# Patient Record
Sex: FEMALE | Race: WHITE | NOT HISPANIC OR LATINO
[De-identification: names, ages, dates, MRNs, and addresses within clinical notes are randomized per-mention and may not be internally consistent; named-entity substitution may affect disease eponyms.]

---

## 2017-05-10 ENCOUNTER — APPOINTMENT (OUTPATIENT)
Dept: ORTHOPEDIC SURGERY | Facility: CLINIC | Age: 82
End: 2017-05-10

## 2017-05-10 DIAGNOSIS — Z82.61 FAMILY HISTORY OF ARTHRITIS: ICD-10-CM

## 2017-05-10 DIAGNOSIS — M17.12 UNILATERAL PRIMARY OSTEOARTHRITIS, LEFT KNEE: ICD-10-CM

## 2017-05-10 DIAGNOSIS — Z87.39 PERSONAL HISTORY OF OTHER DISEASES OF THE MUSCULOSKELETAL SYSTEM AND CONNECTIVE TISSUE: ICD-10-CM

## 2017-05-11 ENCOUNTER — APPOINTMENT (OUTPATIENT)
Dept: GERIATRICS | Facility: CLINIC | Age: 82
End: 2017-05-11

## 2017-05-11 VITALS
BODY MASS INDEX: 21.16 KG/M2 | DIASTOLIC BLOOD PRESSURE: 70 MMHG | WEIGHT: 112 LBS | RESPIRATION RATE: 18 BRPM | OXYGEN SATURATION: 97 % | HEART RATE: 70 BPM | SYSTOLIC BLOOD PRESSURE: 120 MMHG

## 2017-05-11 DIAGNOSIS — R25.8 OTHER ABNORMAL INVOLUNTARY MOVEMENTS: ICD-10-CM

## 2017-05-11 DIAGNOSIS — F41.8 OTHER SPECIFIED ANXIETY DISORDERS: ICD-10-CM

## 2017-05-11 RX ORDER — ESCITALOPRAM OXALATE 5 MG/1
5 TABLET ORAL
Qty: 30 | Refills: 5 | Status: ACTIVE | COMMUNITY
Start: 2017-05-11 | End: 1900-01-01

## 2017-06-22 RX ORDER — HYALURONATE SODIUM 20 MG/2 ML
20 SYRINGE (ML) INTRAARTICULAR
Qty: 6 | Refills: 0 | Status: ACTIVE | OUTPATIENT
Start: 2017-05-10

## 2017-10-18 ENCOUNTER — APPOINTMENT (OUTPATIENT)
Dept: ORTHOPEDIC SURGERY | Facility: CLINIC | Age: 82
End: 2017-10-18

## 2018-12-19 ENCOUNTER — INPATIENT (INPATIENT)
Facility: HOSPITAL | Age: 83
LOS: 6 days | Discharge: HOME | DRG: 480 | End: 2018-12-26
Attending: FAMILY MEDICINE | Admitting: FAMILY MEDICINE
Payer: MEDICARE

## 2018-12-19 ENCOUNTER — TRANSCRIPTION ENCOUNTER (OUTPATIENT)
Age: 83
End: 2018-12-19

## 2018-12-19 VITALS
WEIGHT: 104.94 LBS | TEMPERATURE: 98 F | DIASTOLIC BLOOD PRESSURE: 85 MMHG | RESPIRATION RATE: 14 BRPM | SYSTOLIC BLOOD PRESSURE: 145 MMHG | OXYGEN SATURATION: 100 % | HEART RATE: 74 BPM

## 2018-12-19 DIAGNOSIS — S72.002A FRACTURE OF UNSPECIFIED PART OF NECK OF LEFT FEMUR, INITIAL ENCOUNTER FOR CLOSED FRACTURE: ICD-10-CM

## 2018-12-19 LAB
ALBUMIN SERPL ELPH-MCNC: 3.7 G/DL — SIGNIFICANT CHANGE UP (ref 3.3–5)
ALP SERPL-CCNC: 43 U/L — SIGNIFICANT CHANGE UP (ref 40–120)
ALT FLD-CCNC: 11 U/L — LOW (ref 12–78)
ANION GAP SERPL CALC-SCNC: 4 MMOL/L — LOW (ref 5–17)
APTT BLD: 27.8 SEC — SIGNIFICANT CHANGE UP (ref 27.5–36.3)
AST SERPL-CCNC: 17 U/L — SIGNIFICANT CHANGE UP (ref 15–37)
BASOPHILS # BLD AUTO: 0.02 K/UL — SIGNIFICANT CHANGE UP (ref 0–0.2)
BASOPHILS NFR BLD AUTO: 0.2 % — SIGNIFICANT CHANGE UP (ref 0–2)
BILIRUB SERPL-MCNC: 0.3 MG/DL — SIGNIFICANT CHANGE UP (ref 0.2–1.2)
BLD GP AB SCN SERPL QL: SIGNIFICANT CHANGE UP
BUN SERPL-MCNC: 43 MG/DL — HIGH (ref 7–23)
CALCIUM SERPL-MCNC: 8.5 MG/DL — SIGNIFICANT CHANGE UP (ref 8.5–10.1)
CHLORIDE SERPL-SCNC: 106 MMOL/L — SIGNIFICANT CHANGE UP (ref 96–108)
CO2 SERPL-SCNC: 31 MMOL/L — SIGNIFICANT CHANGE UP (ref 22–31)
CREAT SERPL-MCNC: 0.99 MG/DL — SIGNIFICANT CHANGE UP (ref 0.5–1.3)
EOSINOPHIL # BLD AUTO: 0 K/UL — SIGNIFICANT CHANGE UP (ref 0–0.5)
EOSINOPHIL NFR BLD AUTO: 0 % — SIGNIFICANT CHANGE UP (ref 0–6)
GLUCOSE SERPL-MCNC: 172 MG/DL — HIGH (ref 70–99)
HCT VFR BLD CALC: 33 % — LOW (ref 34.5–45)
HGB BLD-MCNC: 10.8 G/DL — LOW (ref 11.5–15.5)
IMM GRANULOCYTES NFR BLD AUTO: 0.5 % — SIGNIFICANT CHANGE UP (ref 0–1.5)
INR BLD: 1.04 RATIO — SIGNIFICANT CHANGE UP (ref 0.88–1.16)
LYMPHOCYTES # BLD AUTO: 0.43 K/UL — LOW (ref 1–3.3)
LYMPHOCYTES # BLD AUTO: 3.5 % — LOW (ref 13–44)
MCHC RBC-ENTMCNC: 27.8 PG — SIGNIFICANT CHANGE UP (ref 27–34)
MCHC RBC-ENTMCNC: 32.7 GM/DL — SIGNIFICANT CHANGE UP (ref 32–36)
MCV RBC AUTO: 84.8 FL — SIGNIFICANT CHANGE UP (ref 80–100)
MONOCYTES # BLD AUTO: 0.8 K/UL — SIGNIFICANT CHANGE UP (ref 0–0.9)
MONOCYTES NFR BLD AUTO: 6.5 % — SIGNIFICANT CHANGE UP (ref 2–14)
NEUTROPHILS # BLD AUTO: 11.08 K/UL — HIGH (ref 1.8–7.4)
NEUTROPHILS NFR BLD AUTO: 89.3 % — HIGH (ref 43–77)
NRBC # BLD: 0 /100 WBCS — SIGNIFICANT CHANGE UP (ref 0–0)
PLATELET # BLD AUTO: 288 K/UL — SIGNIFICANT CHANGE UP (ref 150–400)
POTASSIUM SERPL-MCNC: 4.2 MMOL/L — SIGNIFICANT CHANGE UP (ref 3.5–5.3)
POTASSIUM SERPL-SCNC: 4.2 MMOL/L — SIGNIFICANT CHANGE UP (ref 3.5–5.3)
PROT SERPL-MCNC: 7 G/DL — SIGNIFICANT CHANGE UP (ref 6–8.3)
PROTHROM AB SERPL-ACNC: 11.8 SEC — SIGNIFICANT CHANGE UP (ref 10–12.9)
RBC # BLD: 3.89 M/UL — SIGNIFICANT CHANGE UP (ref 3.8–5.2)
RBC # FLD: 13.1 % — SIGNIFICANT CHANGE UP (ref 10.3–14.5)
SODIUM SERPL-SCNC: 141 MMOL/L — SIGNIFICANT CHANGE UP (ref 135–145)
WBC # BLD: 12.39 K/UL — HIGH (ref 3.8–10.5)
WBC # FLD AUTO: 12.39 K/UL — HIGH (ref 3.8–10.5)

## 2018-12-19 PROCEDURE — 72170 X-RAY EXAM OF PELVIS: CPT | Mod: 26,59

## 2018-12-19 PROCEDURE — 71045 X-RAY EXAM CHEST 1 VIEW: CPT | Mod: 26

## 2018-12-19 PROCEDURE — 73502 X-RAY EXAM HIP UNI 2-3 VIEWS: CPT | Mod: 26,LT

## 2018-12-19 PROCEDURE — 73610 X-RAY EXAM OF ANKLE: CPT | Mod: 26,LT

## 2018-12-19 PROCEDURE — 99285 EMERGENCY DEPT VISIT HI MDM: CPT

## 2018-12-19 PROCEDURE — 93010 ELECTROCARDIOGRAM REPORT: CPT

## 2018-12-19 PROCEDURE — 73562 X-RAY EXAM OF KNEE 3: CPT | Mod: 26,LT

## 2018-12-19 PROCEDURE — 73552 X-RAY EXAM OF FEMUR 2/>: CPT | Mod: 26,LT

## 2018-12-19 RX ORDER — OXYCODONE HYDROCHLORIDE 5 MG/1
5 TABLET ORAL
Qty: 0 | Refills: 0 | Status: DISCONTINUED | OUTPATIENT
Start: 2018-12-19 | End: 2018-12-20

## 2018-12-19 RX ORDER — MORPHINE SULFATE 50 MG/1
2 CAPSULE, EXTENDED RELEASE ORAL ONCE
Qty: 0 | Refills: 0 | Status: DISCONTINUED | OUTPATIENT
Start: 2018-12-19 | End: 2018-12-19

## 2018-12-19 RX ORDER — MORPHINE SULFATE 50 MG/1
2 CAPSULE, EXTENDED RELEASE ORAL EVERY 6 HOURS
Qty: 0 | Refills: 0 | Status: DISCONTINUED | OUTPATIENT
Start: 2018-12-19 | End: 2018-12-20

## 2018-12-19 RX ORDER — MAGNESIUM OXIDE 400 MG ORAL TABLET 241.3 MG
400 TABLET ORAL DAILY
Qty: 0 | Refills: 0 | Status: DISCONTINUED | OUTPATIENT
Start: 2018-12-19 | End: 2018-12-20

## 2018-12-19 RX ORDER — TRAMADOL HYDROCHLORIDE 50 MG/1
25 TABLET ORAL
Qty: 0 | Refills: 0 | Status: DISCONTINUED | OUTPATIENT
Start: 2018-12-19 | End: 2018-12-20

## 2018-12-19 RX ORDER — LISINOPRIL 2.5 MG/1
2.5 TABLET ORAL DAILY
Qty: 0 | Refills: 0 | Status: DISCONTINUED | OUTPATIENT
Start: 2018-12-19 | End: 2018-12-20

## 2018-12-19 RX ORDER — CARBIDOPA AND LEVODOPA 25; 100 MG/1; MG/1
1 TABLET ORAL THREE TIMES A DAY
Qty: 0 | Refills: 0 | Status: DISCONTINUED | OUTPATIENT
Start: 2018-12-19 | End: 2018-12-20

## 2018-12-19 RX ORDER — ONDANSETRON 8 MG/1
4 TABLET, FILM COATED ORAL ONCE
Qty: 0 | Refills: 0 | Status: COMPLETED | OUTPATIENT
Start: 2018-12-19 | End: 2018-12-19

## 2018-12-19 RX ORDER — SODIUM CHLORIDE 9 MG/ML
1000 INJECTION, SOLUTION INTRAVENOUS
Qty: 0 | Refills: 0 | Status: DISCONTINUED | OUTPATIENT
Start: 2018-12-19 | End: 2018-12-20

## 2018-12-19 RX ORDER — HEPARIN SODIUM 5000 [USP'U]/ML
5000 INJECTION INTRAVENOUS; SUBCUTANEOUS EVERY 12 HOURS
Qty: 0 | Refills: 0 | Status: DISCONTINUED | OUTPATIENT
Start: 2018-12-19 | End: 2018-12-19

## 2018-12-19 RX ADMIN — CARBIDOPA AND LEVODOPA 1 TABLET(S): 25; 100 TABLET ORAL at 21:46

## 2018-12-19 NOTE — H&P ADULT - HISTORY OF PRESENT ILLNESS
SIMONE RIEVRA is an 83y Female brought to ER because of left hip pain after a fall at home.  Patient was unable to get up from floor.  No LOC patient triped and fall onto left side at home. No head injury, headache, neck or back pain. SIMONE RIVERA is an 84 YO Female brought to ER because of left hip pain after a fall at home.  Patient was unable to get up from floor after a fall due to pain in left hip.  No LOC patient tripped and fall onto left side at home. No head injury, headache, neck or back pain.

## 2018-12-19 NOTE — ED ADULT NURSE NOTE - NSIMPLEMENTINTERV_GEN_ALL_ED
Implemented All Fall with Harm Risk Interventions:  [place] to call system. Call bell, personal items and telephone within reach. Instruct patient to call for assistance. Room bathroom lighting operational. Non-slip footwear when patient is off stretcher. Physically safe environment: no spills, clutter or unnecessary equipment. Stretcher in lowest position, wheels locked, appropriate side rails in place. Provide visual cue, wrist band, yellow gown, etc. Monitor gait and stability. Monitor for mental status changes and reorient to person, place, and time. Review medications for side effects contributing to fall risk. Reinforce activity limits and safety measures with patient and family. Provide visual clues: red socks.

## 2018-12-19 NOTE — H&P ADULT - NSHPLABSRESULTS_GEN_ALL_CORE
10.8   12.39 )-----------( 288      ( 19 Dec 2018 18:59 )             33.0     19 Dec 2018 18:59    141    |  106    |  43     ----------------------------<  172    4.2     |  31     |  0.99     Ca    8.5        19 Dec 2018 18:59    TPro  7.0    /  Alb  3.7    /  TBili  0.3    /  DBili  x      /  AST  17     /  ALT  11     /  AlkPhos  43     19 Dec 2018 18:59    LIVER FUNCTIONS - ( 19 Dec 2018 18:59 )  Alb: 3.7 g/dL / Pro: 7.0 g/dL / ALK PHOS: 43 U/L / ALT: 11 U/L / AST: 17 U/L / GGT: x           PT/INR - ( 19 Dec 2018 20:31 )   PT: 11.8 sec;   INR: 1.04 ratio         PTT - ( 19 Dec 2018 20:31 )  PTT:27.8 sec  CAPILLARY BLOOD GLUCOSE            Urinalysis Basic - ( 20 Dec 2018 01:29 )    Color: Yellow / Appearance: Clear / S.020 / pH: x  Gluc: x / Ketone: Negative  / Bili: Negative / Urobili: Negative   Blood: x / Protein: 25 mg/dL / Nitrite: Negative   Leuk Esterase: Small / RBC: Negative /HPF / WBC 3-5   Sq Epi: x / Non Sq Epi: Occasional / Bacteria: Occasional    < from: Xray Ankle Complete 3 Views, Left (18 @ 20:02) >    EXAM:  ANKLE LEFT (MINIMUM 3 V)                            PROCEDURE DATE:  2018          INTERPRETATION:  DATE OF STUDY: 18.    COMPARISON: 18 pelvis; left hip; left femur films.    CLINICAL HISTORY:  Status post left leg trauma    Technique: 2 left ankle films.    FINDINGS:  No fracture or dislocation.  Ankle mortise and talar dome are maintained.  Venous phleboliths noted.    IMPRESSION: No acute fracture-subluxation demonstrated.    < end of copied text >

## 2018-12-19 NOTE — ED PROVIDER NOTE - OBJECTIVE STATEMENT
pt bib ems for left hip pain s/p trip and fall onto left side at home. no head inj, ha, neck or back pain, weakness, numbness, cp, sob, abd pain, arm pain.  pmd - franny

## 2018-12-19 NOTE — H&P ADULT - RS GEN PE MLT RESP DETAILS PC
no chest wall tenderness/no intercostal retractions/clear to auscultation bilaterally/airway patent/respirations non-labored/breath sounds equal/good air movement

## 2018-12-19 NOTE — ED ADULT NURSE NOTE - OBJECTIVE STATEMENT
84 y/o F patient presents to ED from home c/o fall. Patient's daughter reports patient was sitting in chair when she got up and fell onto her left side. Patient's daughter reports patient was on floor and was unable to get up. Patient reports she has pain in left lower extremity only upon exertion and no pain at rest. Patient denies any previous history of falls. Patient A&Ox3. patient Mauritian speaking, daughter bedside translating, refusing translation services. lungs CTA. skin warm and intact. abdomen soft, non tender, non distended. left lower extremity shortening noted. equal sensation in upper and lower extremities b/l. Patient denies HA, dizziness, SOB,c hest pain, abdominal pain, N/V/D, bowel/bladder changes, fevers/chills, cough. VSS. Safety and comfort measures provided and maintained. MD bedside.

## 2018-12-19 NOTE — PROGRESS NOTE ADULT - SUBJECTIVE AND OBJECTIVE BOX
Orthopedic Preop Note    Preop Dx: L IT fx  Surgeon: Harley	  Procedure:  L hip IMN    Vital Signs Last 24 Hrs  T(C): 36.7 (19 Dec 2018 17:31), Max: 36.7 (19 Dec 2018 17:31)  T(F): 98.1 (19 Dec 2018 17:31), Max: 98.1 (19 Dec 2018 17:31)  HR: 74 (19 Dec 2018 17:31) (74 - 74)  BP: 145/85 (19 Dec 2018 17:31) (145/85 - 145/85)  BP(mean): --  RR: 14 (19 Dec 2018 17:31) (14 - 14)  SpO2: 100% (19 Dec 2018 17:31) (100% - 100%)  CBC Full  -  ( 19 Dec 2018 18:59 )  WBC Count : 12.39 K/uL  Hemoglobin : 10.8 g/dL  Hematocrit : 33.0 %  Platelet Count - Automated : 288 K/uL  Mean Cell Volume : 84.8 fl  Mean Cell Hemoglobin : 27.8 pg  Mean Cell Hemoglobin Concentration : 32.7 gm/dL  Auto Neutrophil # : 11.08 K/uL  Auto Lymphocyte # : 0.43 K/uL  Auto Monocyte # : 0.80 K/uL  Auto Eosinophil # : 0.00 K/uL  Auto Basophil # : 0.02 K/uL  Auto Neutrophil % : 89.3 %  Auto Lymphocyte % : 3.5 %  Auto Monocyte % : 6.5 %  Auto Eosinophil % : 0.0 %  Auto Basophil % : 0.2 %    12-19    141  |  106  |  43<H>  ----------------------------<  172<H>  4.2   |  31  |  0.99    Ca    8.5      19 Dec 2018 18:59    TPro  7.0  /  Alb  3.7  /  TBili  0.3  /  DBili  x   /  AST  17  /  ALT  11<L>  /  AlkPhos  43  12-19      Daily     Daily     EKG: In Chart  CXR: Done  UA: Pending  Type and Screen: done  Clearance: medical: pending  Consent: to be done by surgeon    Plan:  OR 12/20/18 for L hip IT fx with Dr. Souza  NPO after midnight except meds  hold anticoagulation after midnight  IVF while NPO  Needs Clearance for OR

## 2018-12-19 NOTE — ED ADULT NURSE REASSESSMENT NOTE - NS ED NURSE REASSESS COMMENT FT1
Assumed care for pt awake alert and oriented x 3.  Pt denies been nauseous.  skin intact. denies pain meds at this time, but pt made aware there is an order for pain meds. Iv intact and dry. will continue to monitor

## 2018-12-19 NOTE — CONSULT NOTE ADULT - SUBJECTIVE AND OBJECTIVE BOX
83y Female, ambulates w/ walker & cane, presents c/o L hip pain and inability to ambulate sp mechanical fall at home. Denies HS/LOC. Denies numbness/tingling. Denies fever/chills. Admits mild L knee and ankle pain.  Denies pain/injury elsewhere. Indonesian-speaking, Daughter Jolanta Edwards (HCP) at bedside assisted w/ translation.      HEALTH ISSUES - PROBLEM Dx:  Parkinson's  HTN  appendectomy      MEDICATIONS  (STANDING):  morphine  - Injectable 2 milliGRAM(s) IV Push Once  ondansetron Injectable 4 milliGRAM(s) IV Push once    Allergies    No Known Allergies    Intolerances                              10.8   12.39 )-----------( 288      ( 19 Dec 2018 18:59 )             33.0     19 Dec 2018 18:59    141    |  106    |  43     ----------------------------<  172    4.2     |  31     |  0.99     Ca    8.5        19 Dec 2018 18:59    TPro  7.0    /  Alb  3.7    /  TBili  0.3    /  DBili  x      /  AST  17     /  ALT  11     /  AlkPhos  43     19 Dec 2018 18:59      Vital Signs Last 24 Hrs  T(C): 36.7 (12-19-18 @ 17:31), Max: 36.7 (12-19-18 @ 17:31)  T(F): 98.1 (12-19-18 @ 17:31), Max: 98.1 (12-19-18 @ 17:31)  HR: 74 (12-19-18 @ 17:31) (74 - 74)  BP: 145/85 (12-19-18 @ 17:31) (145/85 - 145/85)  BP(mean): --  RR: 14 (12-19-18 @ 17:31) (14 - 14)  SpO2: 100% (12-19-18 @ 17:31) (100% - 100%)    Imaging: XR demonstates L hip fracture  will order L knee/ankle films    Physical Exam  Gen: NAD  L LE: skin intact, short/ER leg, unable to SLR L LE, + log roll L LE, +ttp hip/groin, mild generalized ttp L knee/ankle, minimal pain w/ ROM L knee/ankle, no ttp elsewhere, +ehl/fhl/ta/gs function, no calf ttp, dp/pt pulse intact, compartments soft  Secondary survey: benign, nv intact, able to SLR contralateral leg, negative log roll contralateral leg, no bony ttp elsewhere    A/P: 83y Female with L hip IT fracture    Pain control  NWB L LE, bedrest  FU labs/imaging  FU XR L knee/ankle  Ca/Vit D  Outpt osteoporosis workup  Admit to medical team  Medical clearance/optimization for OR  NPO after midnight except meds  IVF while NPO  hold chemical AC after midnight  Will discuss with attending

## 2018-12-19 NOTE — H&P ADULT - NEGATIVE CARDIOVASCULAR SYMPTOMS
no peripheral edema/no palpitations/no dyspnea on exertion/no claudication/no chest pain/no orthopnea/no paroxysmal nocturnal dyspnea

## 2018-12-19 NOTE — CONSULT NOTE ADULT - SUBJECTIVE AND OBJECTIVE BOX
[facility] [place]     Patient is a 83y old  Female who presents with a chief complaint of     HPI:      HPI:    83y Female for Cardiology Consult    PAST MEDICAL & SURGICAL HISTORY:      FAMILY HISTORY:      SOCIAL HISTORY:   Alcohol:  Smoking:    Allergies    No Known Allergies    Intolerances        MEDICATIONS  (STANDING):  carbidopa/levodopa  25/100 1 Tablet(s) Oral three times a day  heparin  Injectable 5000 Unit(s) SubCutaneous every 12 hours  lactated ringers. 1000 milliLiter(s) (75 mL/Hr) IV Continuous <Continuous>  lisinopril 2.5 milliGRAM(s) Oral daily  ondansetron Injectable 4 milliGRAM(s) IV Push once    MEDICATIONS  (PRN):  morphine  - Injectable 2 milliGRAM(s) IV Push every 6 hours PRN Severe Pain (7 - 10)  oxyCODONE    IR 5 milliGRAM(s) Oral four times a day PRN Moderate Pain (4 - 6)  traMADol 25 milliGRAM(s) Oral four times a day PRN Mild Pain (1 - 3)      REVIEW OF SYSTEMS:  CONSTITUTIONAL: No fever, weight loss, chills, shakes, or fat  RESPIRATORY: No cough, wheezing, hemoptysis, or shortness of breath  CARDIOVASCULAR: No chest pain, dyspnea, palpitations, dizziness, syncope, paroxysmal nocturnal dyspnea, orthopnea, or arm or leg swelling  GASTROINTESTINAL: No abdominal  or epigastric pain, nausea, vomiting, hematemesis, diarrhea, constipation, melena or bright red bloo  NEUROLOGICAL: No headaches, memory loss, slurred speech, limb weakness, loss of strength, numbness, or tremors  SKIN: No itching, burning, rashes, or lesions  ENDOCRINE: No heat or cold intolerance, or hair loss  MUSCULOSKELETAL: No joint pain or swelling, muscle, back, or extremity pain  HEME/LYMPH: No easy bruising or bleeding gums  ALLERY AND IMMUNOLOGIC: No hives or rash.    Vital Signs Last 24 Hrs  T(C): 36.7 (19 Dec 2018 17:31), Max: 36.7 (19 Dec 2018 17:31)  T(F): 98.1 (19 Dec 2018 17:31), Max: 98.1 (19 Dec 2018 17:31)  HR: 74 (19 Dec 2018 17:31) (74 - 74)  BP: 145/85 (19 Dec 2018 17:31) (145/85 - 145/85)  BP(mean): --  RR: 14 (19 Dec 2018 17:31) (14 - 14)  SpO2: 100% (19 Dec 2018 17:31) (100% - 100%)    PHYSICAL EXAM:  HEAD:  Atraumatic, Normocephalic  EYES: EOMI, PERRLA, conjunctiva and sclera clear  NECK: Supple and normal thyroid.  No JVD or carotid bruit.   HEART: S1, S2 regular , 1/6 soft ejection systolic murmur in mitral area , no thrill and no gallops .  PULMONARY: Bilateral vesicular breathing , few scattered ronchi and few scattered rales are present .  ABDOMEN: Soft nontender and positive bowl sounds   EXTREMITIES:  No clubbing, cyanosis, or pedal  edema  NEUROLOGICAL: AAOX3 , no focal deficit .    LABS:                        10.8   12.39 )-----------( 288      ( 19 Dec 2018 18:59 )             33.0     12-19    141  |  106  |  43<H>  ----------------------------<  172<H>  4.2   |  31  |  0.99    Ca    8.5      19 Dec 2018 18:59    TPro  7.0  /  Alb  3.7  /  TBili  0.3  /  DBili  x   /  AST  17  /  ALT  11<L>  /  AlkPhos  43  12-19        PT/INR - ( 19 Dec 2018 20:31 )   PT: 11.8 sec;   INR: 1.04 ratio         PTT - ( 19 Dec 2018 20:31 )  PTT:27.8 sec    BNP      EKG:  ECHO:  IMAGING:    Assessment and Plan :     Will continue to follow during hospital course and give further recommendations as needed . Thanks for your referral . if any questions please contact me at office [phone])cell [phone]) [facility] [place]     Patient is a 83y old  Female who presents with a chief complaint of     HPI:      HPI:    83y Female for Cardiology Consult    PAST MEDICAL & SURGICAL HISTORY:      FAMILY HISTORY:      SOCIAL HISTORY:   Alcohol:  Smoking:    Allergies    No Known Allergies    Intolerances        MEDICATIONS  (STANDING):  carbidopa/levodopa  25/100 1 Tablet(s) Oral three times a day  heparin  Injectable 5000 Unit(s) SubCutaneous every 12 hours  lactated ringers. 1000 milliLiter(s) (75 mL/Hr) IV Continuous <Continuous>  lisinopril 2.5 milliGRAM(s) Oral daily  ondansetron Injectable 4 milliGRAM(s) IV Push once    MEDICATIONS  (PRN):  morphine  - Injectable 2 milliGRAM(s) IV Push every 6 hours PRN Severe Pain (7 - 10)  oxyCODONE    IR 5 milliGRAM(s) Oral four times a day PRN Moderate Pain (4 - 6)  traMADol 25 milliGRAM(s) Oral four times a day PRN Mild Pain (1 - 3)      Vital Signs Last 24 Hrs  T(C): 36.7 (19 Dec 2018 17:31), Max: 36.7 (19 Dec 2018 17:31)  T(F): 98.1 (19 Dec 2018 17:31), Max: 98.1 (19 Dec 2018 17:31)  HR: 74 (19 Dec 2018 17:31) (74 - 74)  BP: 145/85 (19 Dec 2018 17:31) (145/85 - 145/85)  BP(mean): --  RR: 14 (19 Dec 2018 17:31) (14 - 14)  SpO2: 100% (19 Dec 2018 17:31) (100% - 100%)  LABS:                        10.8   12.39 )-----------( 288      ( 19 Dec 2018 18:59 )             33.0     12-19    141  |  106  |  43<H>  ----------------------------<  172<H>  4.2   |  31  |  0.99    Ca    8.5      19 Dec 2018 18:59    TPro  7.0  /  Alb  3.7  /  TBili  0.3  /  DBili  x   /  AST  17  /  ALT  11<L>  /  AlkPhos  43  12-19        PT/INR - ( 19 Dec 2018 20:31 )   PT: 11.8 sec;   INR: 1.04 ratio         PTT - ( 19 Dec 2018 20:31 )  PTT:27.8 sec    Assessment and Plan :   FULL CONSULT DICTATED .  83 YEARS OLD FEMALE HAS FALL AND LEFT HIP FRACTURE .  Patient cardiac wise stable . Considering patient has no angina , no evidence of CHF and no pprior MI in last six months and stable cardiac status patient is cleared from cardiac point ov view for Hip surgery . Benefits of surgery outweigh the risks .  Will continue to follow during hospital course and give further recommendations as needed . Thanks for your referral . if any questions please contact me at office ([phone])cell [phone])

## 2018-12-20 ENCOUNTER — TRANSCRIPTION ENCOUNTER (OUTPATIENT)
Age: 83
End: 2018-12-20

## 2018-12-20 DIAGNOSIS — S72.002A FRACTURE OF UNSPECIFIED PART OF NECK OF LEFT FEMUR, INITIAL ENCOUNTER FOR CLOSED FRACTURE: ICD-10-CM
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
## 2018-12-20 LAB
ABO RH CONFIRMATION: SIGNIFICANT CHANGE UP
ANION GAP SERPL CALC-SCNC: 7 MMOL/L — SIGNIFICANT CHANGE UP (ref 5–17)
ANION GAP SERPL CALC-SCNC: 7 MMOL/L — SIGNIFICANT CHANGE UP (ref 5–17)
APPEARANCE UR: CLEAR — SIGNIFICANT CHANGE UP
APTT BLD: 29.2 SEC — SIGNIFICANT CHANGE UP (ref 28.5–37)
BASOPHILS # BLD AUTO: 0.02 K/UL — SIGNIFICANT CHANGE UP (ref 0–0.2)
BASOPHILS NFR BLD AUTO: 0.1 % — SIGNIFICANT CHANGE UP (ref 0–2)
BILIRUB UR-MCNC: NEGATIVE — SIGNIFICANT CHANGE UP
BUN SERPL-MCNC: 31 MG/DL — HIGH (ref 7–23)
BUN SERPL-MCNC: 35 MG/DL — HIGH (ref 7–23)
CALCIUM SERPL-MCNC: 7.8 MG/DL — LOW (ref 8.5–10.1)
CALCIUM SERPL-MCNC: 8 MG/DL — LOW (ref 8.5–10.1)
CHLORIDE SERPL-SCNC: 106 MMOL/L — SIGNIFICANT CHANGE UP (ref 96–108)
CHLORIDE SERPL-SCNC: 106 MMOL/L — SIGNIFICANT CHANGE UP (ref 96–108)
CHOLEST SERPL-MCNC: 152 MG/DL — SIGNIFICANT CHANGE UP (ref 10–199)
CO2 SERPL-SCNC: 28 MMOL/L — SIGNIFICANT CHANGE UP (ref 22–31)
CO2 SERPL-SCNC: 29 MMOL/L — SIGNIFICANT CHANGE UP (ref 22–31)
COLOR SPEC: YELLOW — SIGNIFICANT CHANGE UP
CREAT SERPL-MCNC: 0.89 MG/DL — SIGNIFICANT CHANGE UP (ref 0.5–1.3)
CREAT SERPL-MCNC: 1 MG/DL — SIGNIFICANT CHANGE UP (ref 0.5–1.3)
DIFF PNL FLD: NEGATIVE — SIGNIFICANT CHANGE UP
EOSINOPHIL # BLD AUTO: 0.08 K/UL — SIGNIFICANT CHANGE UP (ref 0–0.5)
EOSINOPHIL NFR BLD AUTO: 0.4 % — SIGNIFICANT CHANGE UP (ref 0–6)
GLUCOSE SERPL-MCNC: 123 MG/DL — HIGH (ref 70–99)
GLUCOSE SERPL-MCNC: 140 MG/DL — HIGH (ref 70–99)
GLUCOSE UR QL: NEGATIVE — SIGNIFICANT CHANGE UP
HCT VFR BLD CALC: 25.3 % — LOW (ref 34.5–45)
HCT VFR BLD CALC: 26.1 % — LOW (ref 34.5–45)
HCT VFR BLD CALC: 27.1 % — LOW (ref 34.5–45)
HDLC SERPL-MCNC: 62 MG/DL — SIGNIFICANT CHANGE UP
HGB BLD-MCNC: 8.3 G/DL — LOW (ref 11.5–15.5)
HGB BLD-MCNC: 8.5 G/DL — LOW (ref 11.5–15.5)
HGB BLD-MCNC: 9 G/DL — LOW (ref 11.5–15.5)
IMM GRANULOCYTES NFR BLD AUTO: 0.9 % — SIGNIFICANT CHANGE UP (ref 0–1.5)
INR BLD: 1.14 RATIO — SIGNIFICANT CHANGE UP (ref 0.88–1.16)
KETONES UR-MCNC: NEGATIVE — SIGNIFICANT CHANGE UP
LEUKOCYTE ESTERASE UR-ACNC: ABNORMAL
LIPID PNL WITH DIRECT LDL SERPL: 80 MG/DL — SIGNIFICANT CHANGE UP
LYMPHOCYTES # BLD AUTO: 1.6 K/UL — SIGNIFICANT CHANGE UP (ref 1–3.3)
LYMPHOCYTES # BLD AUTO: 8.4 % — LOW (ref 13–44)
MAGNESIUM SERPL-MCNC: 1.8 MG/DL — SIGNIFICANT CHANGE UP (ref 1.6–2.6)
MCHC RBC-ENTMCNC: 27.6 PG — SIGNIFICANT CHANGE UP (ref 27–34)
MCHC RBC-ENTMCNC: 28.5 PG — SIGNIFICANT CHANGE UP (ref 27–34)
MCHC RBC-ENTMCNC: 32.6 GM/DL — SIGNIFICANT CHANGE UP (ref 32–36)
MCHC RBC-ENTMCNC: 33.2 GM/DL — SIGNIFICANT CHANGE UP (ref 32–36)
MCV RBC AUTO: 84.7 FL — SIGNIFICANT CHANGE UP (ref 80–100)
MCV RBC AUTO: 85.8 FL — SIGNIFICANT CHANGE UP (ref 80–100)
MONOCYTES # BLD AUTO: 1.45 K/UL — HIGH (ref 0–0.9)
MONOCYTES NFR BLD AUTO: 7.6 % — SIGNIFICANT CHANGE UP (ref 2–14)
NEUTROPHILS # BLD AUTO: 15.7 K/UL — HIGH (ref 1.8–7.4)
NEUTROPHILS NFR BLD AUTO: 82.6 % — HIGH (ref 43–77)
NITRITE UR-MCNC: NEGATIVE — SIGNIFICANT CHANGE UP
NRBC # BLD: 0 /100 WBCS — SIGNIFICANT CHANGE UP (ref 0–0)
PH UR: 5 — SIGNIFICANT CHANGE UP (ref 5–8)
PLATELET # BLD AUTO: 214 K/UL — SIGNIFICANT CHANGE UP (ref 150–400)
PLATELET # BLD AUTO: 226 K/UL — SIGNIFICANT CHANGE UP (ref 150–400)
POTASSIUM SERPL-MCNC: 4.5 MMOL/L — SIGNIFICANT CHANGE UP (ref 3.5–5.3)
POTASSIUM SERPL-MCNC: 4.5 MMOL/L — SIGNIFICANT CHANGE UP (ref 3.5–5.3)
POTASSIUM SERPL-SCNC: 4.5 MMOL/L — SIGNIFICANT CHANGE UP (ref 3.5–5.3)
POTASSIUM SERPL-SCNC: 4.5 MMOL/L — SIGNIFICANT CHANGE UP (ref 3.5–5.3)
PROCALCITONIN SERPL-MCNC: 0.09 NG/ML — HIGH (ref 0–0.04)
PROT UR-MCNC: 25 MG/DL
PROTHROM AB SERPL-ACNC: 13 SEC — HIGH (ref 10–12.9)
RBC # BLD: 3.08 M/UL — LOW (ref 3.8–5.2)
RBC # BLD: 3.16 M/UL — LOW (ref 3.8–5.2)
RBC # FLD: 13.2 % — SIGNIFICANT CHANGE UP (ref 10.3–14.5)
RBC # FLD: 13.2 % — SIGNIFICANT CHANGE UP (ref 10.3–14.5)
SODIUM SERPL-SCNC: 141 MMOL/L — SIGNIFICANT CHANGE UP (ref 135–145)
SODIUM SERPL-SCNC: 142 MMOL/L — SIGNIFICANT CHANGE UP (ref 135–145)
SP GR SPEC: 1.02 — SIGNIFICANT CHANGE UP (ref 1.01–1.02)
TOTAL CHOLESTEROL/HDL RATIO MEASUREMENT: 2.5 RATIO — LOW (ref 3.3–7.1)
TRIGL SERPL-MCNC: 52 MG/DL — SIGNIFICANT CHANGE UP (ref 10–149)
TSH SERPL-MCNC: 0.96 UIU/ML — SIGNIFICANT CHANGE UP (ref 0.36–3.74)
UROBILINOGEN FLD QL: NEGATIVE — SIGNIFICANT CHANGE UP
WBC # BLD: 11.07 K/UL — HIGH (ref 3.8–10.5)
WBC # BLD: 19.03 K/UL — HIGH (ref 3.8–10.5)
WBC # FLD AUTO: 11.07 K/UL — HIGH (ref 3.8–10.5)
WBC # FLD AUTO: 19.03 K/UL — HIGH (ref 3.8–10.5)

## 2018-12-20 PROCEDURE — 93010 ELECTROCARDIOGRAM REPORT: CPT

## 2018-12-20 RX ORDER — ACETAMINOPHEN 500 MG
650 TABLET ORAL EVERY 6 HOURS
Qty: 0 | Refills: 0 | Status: DISCONTINUED | OUTPATIENT
Start: 2018-12-20 | End: 2018-12-20

## 2018-12-20 RX ORDER — ONDANSETRON 8 MG/1
4 TABLET, FILM COATED ORAL EVERY 6 HOURS
Qty: 0 | Refills: 0 | Status: DISCONTINUED | OUTPATIENT
Start: 2018-12-20 | End: 2018-12-26

## 2018-12-20 RX ORDER — FOLIC ACID 0.8 MG
1 TABLET ORAL DAILY
Qty: 0 | Refills: 0 | Status: DISCONTINUED | OUTPATIENT
Start: 2018-12-20 | End: 2018-12-26

## 2018-12-20 RX ORDER — FERROUS SULFATE 325(65) MG
325 TABLET ORAL
Qty: 0 | Refills: 0 | Status: DISCONTINUED | OUTPATIENT
Start: 2018-12-20 | End: 2018-12-26

## 2018-12-20 RX ORDER — CARBIDOPA AND LEVODOPA 25; 100 MG/1; MG/1
1 TABLET ORAL THREE TIMES A DAY
Qty: 0 | Refills: 0 | Status: DISCONTINUED | OUTPATIENT
Start: 2018-12-20 | End: 2018-12-26

## 2018-12-20 RX ORDER — DOCUSATE SODIUM 100 MG
100 CAPSULE ORAL THREE TIMES A DAY
Qty: 0 | Refills: 0 | Status: DISCONTINUED | OUTPATIENT
Start: 2018-12-20 | End: 2018-12-26

## 2018-12-20 RX ORDER — SODIUM CHLORIDE 9 MG/ML
500 INJECTION, SOLUTION INTRAVENOUS
Qty: 0 | Refills: 0 | Status: COMPLETED | OUTPATIENT
Start: 2018-12-20 | End: 2018-12-21

## 2018-12-20 RX ORDER — SODIUM CHLORIDE 9 MG/ML
1000 INJECTION, SOLUTION INTRAVENOUS
Qty: 0 | Refills: 0 | Status: DISCONTINUED | OUTPATIENT
Start: 2018-12-20 | End: 2018-12-20

## 2018-12-20 RX ORDER — HYDROMORPHONE HYDROCHLORIDE 2 MG/ML
0.5 INJECTION INTRAMUSCULAR; INTRAVENOUS; SUBCUTANEOUS EVERY 6 HOURS
Qty: 0 | Refills: 0 | Status: DISCONTINUED | OUTPATIENT
Start: 2018-12-20 | End: 2018-12-20

## 2018-12-20 RX ORDER — CEFTRIAXONE 500 MG/1
1 INJECTION, POWDER, FOR SOLUTION INTRAMUSCULAR; INTRAVENOUS EVERY 24 HOURS
Qty: 0 | Refills: 0 | Status: DISCONTINUED | OUTPATIENT
Start: 2018-12-20 | End: 2018-12-20

## 2018-12-20 RX ORDER — ONDANSETRON 8 MG/1
4 TABLET, FILM COATED ORAL ONCE
Qty: 0 | Refills: 0 | Status: DISCONTINUED | OUTPATIENT
Start: 2018-12-20 | End: 2018-12-20

## 2018-12-20 RX ORDER — LISINOPRIL 2.5 MG/1
2.5 TABLET ORAL DAILY
Qty: 0 | Refills: 0 | Status: DISCONTINUED | OUTPATIENT
Start: 2018-12-20 | End: 2018-12-21

## 2018-12-20 RX ORDER — DIPHENHYDRAMINE HCL 50 MG
25 CAPSULE ORAL AT BEDTIME
Qty: 0 | Refills: 0 | Status: DISCONTINUED | OUTPATIENT
Start: 2018-12-20 | End: 2018-12-26

## 2018-12-20 RX ORDER — SODIUM CHLORIDE 9 MG/ML
1000 INJECTION INTRAMUSCULAR; INTRAVENOUS; SUBCUTANEOUS
Qty: 0 | Refills: 0 | Status: DISCONTINUED | OUTPATIENT
Start: 2018-12-20 | End: 2018-12-21

## 2018-12-20 RX ORDER — HYDROMORPHONE HYDROCHLORIDE 2 MG/ML
0.5 INJECTION INTRAMUSCULAR; INTRAVENOUS; SUBCUTANEOUS EVERY 6 HOURS
Qty: 0 | Refills: 0 | Status: DISCONTINUED | OUTPATIENT
Start: 2018-12-20 | End: 2018-12-26

## 2018-12-20 RX ORDER — ACETAMINOPHEN 500 MG
650 TABLET ORAL EVERY 6 HOURS
Qty: 0 | Refills: 0 | Status: DISCONTINUED | OUTPATIENT
Start: 2018-12-20 | End: 2018-12-26

## 2018-12-20 RX ORDER — ASCORBIC ACID 60 MG
500 TABLET,CHEWABLE ORAL
Qty: 0 | Refills: 0 | Status: DISCONTINUED | OUTPATIENT
Start: 2018-12-20 | End: 2018-12-26

## 2018-12-20 RX ORDER — OXYCODONE HYDROCHLORIDE 5 MG/1
10 TABLET ORAL EVERY 4 HOURS
Qty: 0 | Refills: 0 | Status: DISCONTINUED | OUTPATIENT
Start: 2018-12-20 | End: 2018-12-26

## 2018-12-20 RX ORDER — HYDROMORPHONE HYDROCHLORIDE 2 MG/ML
0.5 INJECTION INTRAMUSCULAR; INTRAVENOUS; SUBCUTANEOUS
Qty: 0 | Refills: 0 | Status: DISCONTINUED | OUTPATIENT
Start: 2018-12-20 | End: 2018-12-20

## 2018-12-20 RX ORDER — ENOXAPARIN SODIUM 100 MG/ML
40 INJECTION SUBCUTANEOUS EVERY 24 HOURS
Qty: 0 | Refills: 0 | Status: DISCONTINUED | OUTPATIENT
Start: 2018-12-21 | End: 2018-12-26

## 2018-12-20 RX ORDER — CEFAZOLIN SODIUM 1 G
2000 VIAL (EA) INJECTION EVERY 8 HOURS
Qty: 0 | Refills: 0 | Status: COMPLETED | OUTPATIENT
Start: 2018-12-20 | End: 2018-12-21

## 2018-12-20 RX ORDER — OXYCODONE HYDROCHLORIDE 5 MG/1
5 TABLET ORAL EVERY 4 HOURS
Qty: 0 | Refills: 0 | Status: DISCONTINUED | OUTPATIENT
Start: 2018-12-20 | End: 2018-12-26

## 2018-12-20 RX ADMIN — MORPHINE SULFATE 2 MILLIGRAM(S): 50 CAPSULE, EXTENDED RELEASE ORAL at 00:24

## 2018-12-20 RX ADMIN — Medication 650 MILLIGRAM(S): at 07:59

## 2018-12-20 RX ADMIN — SODIUM CHLORIDE 60 MILLILITER(S): 9 INJECTION, SOLUTION INTRAVENOUS at 00:25

## 2018-12-20 RX ADMIN — CEFTRIAXONE 100 GRAM(S): 500 INJECTION, POWDER, FOR SOLUTION INTRAMUSCULAR; INTRAVENOUS at 11:15

## 2018-12-20 RX ADMIN — LISINOPRIL 2.5 MILLIGRAM(S): 2.5 TABLET ORAL at 05:38

## 2018-12-20 RX ADMIN — CARBIDOPA AND LEVODOPA 1 TABLET(S): 25; 100 TABLET ORAL at 05:38

## 2018-12-20 RX ADMIN — Medication 100 MILLIGRAM(S): at 23:29

## 2018-12-20 RX ADMIN — Medication 650 MILLIGRAM(S): at 09:00

## 2018-12-20 RX ADMIN — MAGNESIUM OXIDE 400 MG ORAL TABLET 400 MILLIGRAM(S): 241.3 TABLET ORAL at 11:16

## 2018-12-20 NOTE — PROGRESS NOTE ADULT - SUBJECTIVE AND OBJECTIVE BOX
[facility] [place]       HPI:  SIMONE RIVERA is an 82 YO Female brought to ER because of left hip pain after a fall at home.  Patient was unable to get up from floor after a fall due to pain in left hip.  No LOC patient tripped and fall onto left side at home. No head injury, headache, neck or back pain. (19 Dec 2018 22:09)        SUBJECTIVE:      ALLERGIES:  Allergies    No Known Allergies    Intolerances          MEDICATIONS  (STANDING):  ascorbic acid 500 milliGRAM(s) Oral two times a day  carbidopa/levodopa  25/100 1 Tablet(s) Oral three times a day  ceFAZolin   IVPB 2000 milliGRAM(s) IV Intermittent every 8 hours  docusate sodium 100 milliGRAM(s) Oral three times a day  ferrous    sulfate 325 milliGRAM(s) Oral three times a day with meals  folic acid 1 milliGRAM(s) Oral daily  lisinopril 2.5 milliGRAM(s) Oral daily  multivitamin 1 Tablet(s) Oral daily  sodium chloride 0.9%. 1000 milliLiter(s) (75 mL/Hr) IV Continuous <Continuous>    MEDICATIONS  (PRN):  acetaminophen   Tablet .. 650 milliGRAM(s) Oral every 6 hours PRN Temp greater or equal to 38C (100.4F), Mild Pain (1 - 3)  diphenhydrAMINE 25 milliGRAM(s) Oral at bedtime PRN Insomnia  HYDROmorphone  Injectable 0.5 milliGRAM(s) IV Push every 6 hours PRN breakthrough pain  ondansetron Injectable 4 milliGRAM(s) IV Push every 6 hours PRN Nausea and/or Vomiting  oxyCODONE    IR 10 milliGRAM(s) Oral every 4 hours PRN Severe Pain (7 - 10)  oxyCODONE    IR 5 milliGRAM(s) Oral every 4 hours PRN Moderate Pain (4 - 6)      REVIEW OF SYSTEMS:  CONSTITUTIONAL: No fever,  RESPIRATORY: No cough, wheezing, shortness of breath  CARDIOVASCULAR: No chest pain, dyspnea, palpitations, dizziness, syncope, paroxysmal nocturnal dyspnea, orthopnea, or arm or leg swelling  GASTROINTESTINAL: No abdominal  or epigastric pain, nausea, vomiting,  diarrhea  NEUROLOGICAL: No headaches,  loss of strength, numbness, or tremors    Vital Signs Last 24 Hrs  T(C): 37.4 (20 Dec 2018 20:23), Max: 38.2 (20 Dec 2018 00:53)  T(F): 99.4 (20 Dec 2018 20:23), Max: 100.8 (20 Dec 2018 00:53)  HR: 83 (20 Dec 2018 20:23) (74 - 94)  BP: 102/66 (20 Dec 2018 20:23) (97/66 - 161/80)  BP(mean): --  RR: 18 (20 Dec 2018 20:23) (14 - 24)  SpO2: 100% (20 Dec 2018 20:23) (95% - 100%)    PHYSICAL EXAM:  HEAD:  Atraumatic, Normocephalic  NECK: Supple and normal thyroid.  No JVD or carotid bruit.   HEART: S1, S2 regular , 1/6 soft ejection systolic murmur in mitral area , no thrill and no gallops .  PULMONARY: Bilateral vesicular breathing , few scattered ronchi and few scattered rales are present .  ABDOMEN: Soft nontender and positive bowl sounds   EXTREMITIES:  No clubbing, cyanosis, or pedal  edema  NEUROLOGICAL: AAOX3 , no focal deficit .    LABS:                        9.0    19.03 )-----------( 214      ( 20 Dec 2018 17:26 )             27.1     12-20    142  |  106  |  31<H>  ----------------------------<  140<H>  4.5   |  29  |  1.00    Ca    7.8<L>      20 Dec 2018 17:26  Mg     1.8     12-20    TPro  7.0  /  Alb  3.7  /  TBili  0.3  /  DBili  x   /  AST  17  /  ALT  11<L>  /  AlkPhos  43  12-19        PT/INR - ( 20 Dec 2018 07:21 )   PT: 13.0 sec;   INR: 1.14 ratio         PTT - ( 20 Dec 2018 07:21 )  PTT:29.2 sec  Urinalysis Basic - ( 20 Dec 2018 01:29 )    Color: Yellow / Appearance: Clear / S.020 / pH: x  Gluc: x / Ketone: Negative  / Bili: Negative / Urobili: Negative   Blood: x / Protein: 25 mg/dL / Nitrite: Negative   Leuk Esterase: Small / RBC: Negative /HPF / WBC 3-5   Sq Epi: x / Non Sq Epi: Occasional / Bacteria: Occasional      BNP      EKG:  ECHO:  IMAGING:    Assessment/Plan    Will continue to follow during hospital course and give further recommendations as needed . Thanks for your referral . if any questions please contact me at office ([phone] [phone]) [facility] [place]       HPI:  SIMONE RIVERA is an 84 YO Female brought to ER because of left hip pain after a fall at home.  Patient was unable to get up from floor after a fall due to pain in left hip.  No LOC patient tripped and fall onto left side at home. No head injury, headache, neck or back pain. (19 Dec 2018 22:09)    ( COVERING DR MONTGOMERY )     SUBJECTIVE: patient mild lethargic but no chest pain and SOB       ALLERGIES:  Allergies    No Known Allergies    Intolerances          MEDICATIONS  (STANDING):  ascorbic acid 500 milliGRAM(s) Oral two times a day  carbidopa/levodopa  25/100 1 Tablet(s) Oral three times a day  ceFAZolin   IVPB 2000 milliGRAM(s) IV Intermittent every 8 hours  docusate sodium 100 milliGRAM(s) Oral three times a day  ferrous    sulfate 325 milliGRAM(s) Oral three times a day with meals  folic acid 1 milliGRAM(s) Oral daily  lisinopril 2.5 milliGRAM(s) Oral daily  multivitamin 1 Tablet(s) Oral daily  sodium chloride 0.9%. 1000 milliLiter(s) (75 mL/Hr) IV Continuous <Continuous>    MEDICATIONS  (PRN):  acetaminophen   Tablet .. 650 milliGRAM(s) Oral every 6 hours PRN Temp greater or equal to 38C (100.4F), Mild Pain (1 - 3)  diphenhydrAMINE 25 milliGRAM(s) Oral at bedtime PRN Insomnia  HYDROmorphone  Injectable 0.5 milliGRAM(s) IV Push every 6 hours PRN breakthrough pain  ondansetron Injectable 4 milliGRAM(s) IV Push every 6 hours PRN Nausea and/or Vomiting  oxyCODONE    IR 10 milliGRAM(s) Oral every 4 hours PRN Severe Pain (7 - 10)  oxyCODONE    IR 5 milliGRAM(s) Oral every 4 hours PRN Moderate Pain (4 - 6)      REVIEW OF SYSTEMS:  CONSTITUTIONAL: No fever,  RESPIRATORY: No cough, wheezing, shortness of breath  CARDIOVASCULAR: No chest pain, dyspnea, palpitations, dizziness, syncope, paroxysmal nocturnal dyspnea, orthopnea, or arm or leg swelling  GASTROINTESTINAL: No abdominal  or epigastric pain, nausea, vomiting,  diarrhea  NEUROLOGICAL: No headaches,  loss of strength, numbness, or tremors but confused .    Vital Signs Last 24 Hrs  T(C): 37.4 (20 Dec 2018 20:23), Max: 38.2 (20 Dec 2018 00:53)  T(F): 99.4 (20 Dec 2018 20:23), Max: 100.8 (20 Dec 2018 00:53)  HR: 83 (20 Dec 2018 20:23) (74 - 94)  BP: 102/66 (20 Dec 2018 20:23) (97/66 - 161/80)  BP(mean): --  RR: 18 (20 Dec 2018 20:23) (14 - 24)  SpO2: 100% (20 Dec 2018 20:23) (95% - 100%)    PHYSICAL EXAM:  HEAD:  Atraumatic, Normocephalic  NECK: Supple and normal thyroid.  No JVD or carotid bruit.   HEART: S1, S2 regular , 1/6 soft ejection systolic murmur in mitral area , no thrill and no gallops .  PULMONARY: Bilateral vesicular breathing , few scattered rhonchi and few scattered rales are present .  ABDOMEN: Soft nontender and positive bowl sounds   EXTREMITIES:  No clubbing, cyanosis, or pedal  edema  NEUROLOGICAL: AA but mild confused and no focal deficit .    LABS:                        9.0    19.03 )-----------( 214      ( 20 Dec 2018 17:26 )             27.1     12-20    142  |  106  |  31<H>  ----------------------------<  140<H>  4.5   |  29  |  1.00    Ca    7.8<L>      20 Dec 2018 17:26  Mg     1.8     12-20    TPro  7.0  /  Alb  3.7  /  TBili  0.3  /  DBili  x   /  AST  17  /  ALT  11<L>  /  AlkPhos  43  12-19        PT/INR - ( 20 Dec 2018 07:21 )   PT: 13.0 sec;   INR: 1.14 ratio         PTT - ( 20 Dec 2018 07:21 )  PTT:29.2 sec  Urinalysis Basic - ( 20 Dec 2018 01:29 )    Color: Yellow / Appearance: Clear / S.020 / pH: x  Gluc: x / Ketone: Negative  / Bili: Negative / Urobili: Negative   Blood: x / Protein: 25 mg/dL / Nitrite: Negative   Leuk Esterase: Small / RBC: Negative /HPF / WBC 3-5   Sq Epi: x / Non Sq Epi: Occasional / Bacteria: Occasional      Assessment/Plan  Patient has :  1) Hip fracture and S/P Hip surgery .  2) H/O Hypertension and BP lower limit of normal .  3) Anemia .  Plan : 1) Cardiac stable so far . 2) I/V hydration as tolerated 3) Monitor hemoglobin and electrolytes .   Will continue to follow during hospital course and give further recommendations as needed . Thanks for your referral . if any questions please contact me at office ([phone])cell [phone])

## 2018-12-20 NOTE — PRE-OP CHECKLIST - MUPIRONCIN COMMENTS
done on [facility] done on 2 [facility] 1pm

## 2018-12-20 NOTE — DISCHARGE NOTE ADULT - CARE PLAN
Principal Discharge DX:	Closed fracture of left hip, initial encounter  Goal:	Return to ADL's  Assessment and plan of treatment:	IM Nail DC Instructions:    1.	Resume previous diet, regular or diabetic as appropriate  2.	Weight Bearing as Tolerated with assistance and rolling walker  3.	Continue DVT/PE Prophylaxis. Lovenox SC 40mg Daily. See Med Rec for Duration and dose.  4.	PT daily  5.	Follow up with Orthopedic Surgeon Dr Souza in 10-14 Days after Discharge from the Hospital. Call Office asap For Appointment.  6.	Staples to be removed Post-Op Day 14, provided wound is healed, no open areas or copious drainage.  7.	Ice the hip as much as possible  8.	Keep Aquacel bandage on Hip. Change only if wet or soiled using Tegaderm/Paper tape and dry gauze.  9.                OK to shower with Aquacel beige bandage, otherwise sponge bathe only. Will need assistance to shower.

## 2018-12-20 NOTE — DIETITIAN INITIAL EVALUATION ADULT. - NS AS NUTRI INTERV MEALS SNACK
Other (specify)/Continue regular diet as it remains appropriate at this time. Encourage po intake of nutrient dense meals/snacks.

## 2018-12-20 NOTE — DIETITIAN INITIAL EVALUATION ADULT. - OTHER INFO
Pt seen for nutrition referral for assessment. Per chart, pt is Pt seen for nutrition referral for assessment. Per chart, pt is 82 Y/O Georgian speaking female admitted for L hip fracture. Pt alert during time of interview. Per daughter, pt's UBW is 114 pounds. She has had a 9 pound unintentional wt loss x3 months (8%) and current weight is 105 pounds. Pt denied N/V/diarrhea/constipation, last BM x2 days ago. No h/o dysphagia however pt does have poor dentition (dentures do not fit anymore per daughter) and will require soft foods when diet resumed. Daughter amenable to added Ensure Enlive BID in light of recent wt loss and upcoming surgery.

## 2018-12-20 NOTE — DIETITIAN INITIAL EVALUATION ADULT. - ORAL INTAKE PTA
Pt's daughter reports poor appetite/intake prior to admission. States that her mother was consuming small meals and drinking [employer] brand protein shake 2x daily. Pt does not cook, family prepares all meals. Taking multivitamin PTA.

## 2018-12-20 NOTE — GOALS OF CARE CONVERSATION - PERSONAL ADVANCE DIRECTIVE - CONVERSATION DETAILS
met pt with Ray re[facility], pt declines  services, prefers daughter to translate to her regarding hcP,  long discussion regarding pt resuscitation wishes. no decisions at present, will discuss further when home. pt is full code at present. pt wants pt ray reza as hcp, alt hcp, other daughter: Carmelina, then her . hcp completed. contact # given. pt for OR today.

## 2018-12-20 NOTE — DISCHARGE NOTE ADULT - PATIENT PORTAL LINK FT
You can access the [facility] Patient Portal, offered by [facility], by registering with the following website: [email or web address]

## 2018-12-20 NOTE — DIETITIAN INITIAL EVALUATION ADULT. - ADHERENCE
Pt's daughter reports that pt followed no specialized diet PTA. States that she has ill-fitting dentures and therefore needs softer foods. No steak, no black pepper.

## 2018-12-20 NOTE — PROGRESS NOTE ADULT - SUBJECTIVE AND OBJECTIVE BOX
Patient seen and examined in PACU, Maori speaking, able to follow simple commands. Pain controlled. Tolerated procedure well.       MEDICATIONS  (STANDING):  carbidopa/levodopa  25/100 1 Tablet(s) Oral three times a day  lactated ringers. 1000 milliLiter(s) IV Continuous <Continuous>  lisinopril 2.5 milliGRAM(s) Oral daily    Allergies    No Known Allergies    Intolerances                            9.0    19.03 )-----------( 214      ( 20 Dec 2018 17:26 )             27.1     20 Dec 2018 17:26    142    |  106    |  31     ----------------------------<  140    4.5     |  29     |  1.00     Ca    7.8        20 Dec 2018 17:26  Mg     1.8       20 Dec 2018 07:21    TPro  7.0    /  Alb  3.7    /  TBili  0.3    /  DBili  x      /  AST  17     /  ALT  11     /  AlkPhos  43     19 Dec 2018 18:59    PT/INR - ( 20 Dec 2018 07:21 )   PT: 13.0 sec;   INR: 1.14 ratio         PTT - ( 20 Dec 2018 07:21 )  PTT:29.2 sec  Vital Signs Last 24 Hrs  T(C): 36.9 (12-20-18 @ 16:45), Max: 38.2 (12-20-18 @ 00:53)  T(F): 98.4 (12-20-18 @ 16:45), Max: 100.8 (12-20-18 @ 00:53)  HR: 82 (12-20-18 @ 18:08) (74 - 94)  BP: 107/42 (12-20-18 @ 18:08) (102/52 - 161/80)  RR: 16 (12-20-18 @ 18:08) (14 - 24)  SpO2: 100% (12-20-18 @ 18:08) (95% - 100%)    Physical Exam  Gen: NAD  LLE:   Dressing c/d/i, hematoma palpable along distal bandage visualized, no skin changes.  +ehl/fhl/ta/gs function  L2-S1 silt  Dp/pt pulse intact  No calf ttp  Compartments soft    A/P: 83y Female sp L Hip IM Nail POD 0  Continue post op abx  FU AM labs, will likely need transfusion in AM  Pain control  DVT ppx- Lovenox to begin POD 1  PT/WBAT/OOB  Ice/elevate  Medical management appreciated  Incentive spirometry  Dispo planning

## 2018-12-20 NOTE — DIETITIAN INITIAL EVALUATION ADULT. - NS AS NUTRI INTERV MEDICAL AND FOOD SUPPLEMENTS
Add on Ensure Enlive BID Vanilla) for additional 700 kcal, 40 grams of protein. Pt amenable to drinking supplement.

## 2018-12-20 NOTE — DISCHARGE NOTE ADULT - PLAN OF CARE
Return to ADL's IM Nail DC Instructions:    1.	Resume previous diet, regular or diabetic as appropriate  2.	Weight Bearing as Tolerated with assistance and rolling walker  3.	Continue DVT/PE Prophylaxis. Lovenox SC 40mg Daily. See Med Rec for Duration and dose.  4.	PT daily  5.	Follow up with Orthopedic Surgeon Dr Souza in 10-14 Days after Discharge from the Hospital. Call Office asap For Appointment.  6.	Staples to be removed Post-Op Day 14, provided wound is healed, no open areas or copious drainage.  7.	Ice the hip as much as possible  8.	Keep Aquacel bandage on Hip. Change only if wet or soiled using Tegaderm/Paper tape and dry gauze.  9.                OK to shower with Aquacel beige bandage, otherwise sponge bathe only. Will need assistance to shower.

## 2018-12-20 NOTE — DISCHARGE NOTE ADULT - HOSPITAL COURSE
SIMONE RIVERA is an 84 YO Female brought to ER because of left hip pain after a fall at home.  Patient was unable to get up from floor after a fall due to pain in left hip.  No LOC patient tripped and fall onto left side at home. No head injury, headache, neck or back pain.    Had L hip IMN  Had post-op encephalopathy likely secondary to anesthesia. Resolved  Going to [facility]    >30 minutes spent on discharge

## 2018-12-20 NOTE — CONSULT NOTE ADULT - SUBJECTIVE AND OBJECTIVE BOX
Infectious Diseases Consult by Harry Huerta MD    Reason for Consult :    HPI:  SIMONE RIVERA is an 83y Female brought to ER because of left hip pain after a fall at home.  Patient was unable to get up from floor.  No LOC patient triped and fall onto left side at home. No head injury, headache, neck or back pain. (19 Dec 2018 22:09)    patient briefly seen going to OR for IM nailing of left hip Fx     Past Medical & Surgical Hx:  PAST MEDICAL & SURGICAL HISTORY:      Social History--  EtOH: denies ***  Tobacco: denies ***  Drug Use: denies ***    Travel/Environmental/Occupational History:  *** inserth T/E/O Hx ***    FAMILY HISTORY:  No pertinent family history in first degree relatives      Allergies    No Known Allergies    Intolerances        Home/ Out patient  Medications :  Home Medications:  benazepril:  (20 Dec 2018 10:19)  carbidopa-levodopa:  (20 Dec 2018 10:19)      Current Inpatient Medications :    ANTIBIOTICS:       OTHER RELEVANT MEDICATIONS :      ROS:  Unable to obtain due to :     ROS:  CONSTITUTIONAL:  Negative fever or chills, feels well, good appetite  EYES:  Negative  blurry vision or double vision  CARDIOVASCULAR:  Negative for chest pain or palpitations  RESPIRATORY:  Negative for cough, wheezing, or SOB   GASTROINTESTINAL:  Negative for nausea, vomiting, diarrhea, constipation, or abdominal pain  GENITOURINARY:  Negative frequency, urgency , dysuria or hematuria   NEUROLOGIC:  No headache, confusion, dizziness, lightheadedness  All other systems were reviewed and are negative          I&O's Detail    19 Dec 2018 07:01  -  20 Dec 2018 07:00  --------------------------------------------------------  IN:  Total IN: 0 mL    OUT:    Voided: 400 mL  Total OUT: 400 mL    Total NET: -400 mL          Physical Exam:  Vital Signs Last 24 Hrs  T(C): 37.7 (20 Dec 2018 13:40), Max: 38.2 (20 Dec 2018 00:53)  T(F): 99.9 (20 Dec 2018 13:40), Max: 100.8 (20 Dec 2018 00:53)  HR: 81 (20 Dec 2018 13:40) (74 - 94)  BP: 157/71 (20 Dec 2018 13:40) (117/67 - 161/80)  BP(mean): --  RR: 16 (20 Dec 2018 13:40) (14 - 24)  SpO2: 98% (20 Dec 2018 13:40) (95% - 100%)  Height (cm): 157.48 (12-20 @ 13:08)  Weight (kg): 47.6 (12-20 @ 09:11)  BMI (kg/m2): 19.2 (12-20 @ 13:08)  BSA (m2): 1.45 (12-20 @ 13:08)    General: well developed well nourished, in no acute distress  Eyes: sclera anicteric, pupils equal and reactive to light  ENMT: buccal mucosa moist, pharynx not injected  Neck: supple, trachea midline  Lungs: clear, no wheeze/rhonchi  Cardiovascular: regular rate and rhythm, S1 S2  Abdomen: soft, nontender, no organomegaly present, bowel sounds normal  Neurological:  alert and oriented x3, Cranial Nerves II-XII grossly intact  Skin:no increased ecchymosis/petechiae/purpura  Lymph Nodes: no palpable cervical/supraclavicular lymph nodes enlargements  Extremities: no cyanosis/clubbing/edema    Labs:                          8.3    x     )-----------( x        ( 20 Dec 2018 09:28 )             25.3         RECENT CULTURES:          RADIOLOGY & ADDITIONAL STUDIES:    Assessment :             Plan :       Continue with present regime .  Approptiate use of antibiotics and adverse effects reviewed.      I have discussed the above plan of care with patient/family in detail. They expressed understanding of the treatment plan . Risks, benefits and alternatives discussed in detail. I have asked if they have any questions or concerns and appropriately addressed them to the best of my ability .      > 45 minutes spent in direct patient care reviewing  the notes, lab data/ imaging , discussion with multidisciplinary team. All questions were addressed and answered to the best of my capacity .    Thank you for allowing me to participate in the care of your patient .      Harry Huerta MD  [phone] Infectious Diseases Consult by Harry Huerta MD    Reason for Consult : fever     HPI:  SIMONE RIVERA is an 83y Female brought to ER because of left hip pain after a fall at home.  Patient was unable to get up from floor.  No LOC patient triped and fall onto left side at home. No head injury, headache, neck or back pain. Noted to have low grade fever on admission     patient briefly seen going to OR for IM nailing of left hip Fx     Past Medical & Surgical Hx:  PAST MEDICAL & SURGICAL HISTORY:      Social History-- Retired   EtOH: denies   Tobacco: denies   Drug Use: denies       FAMILY HISTORY:  No pertinent family history in first degree relatives      Allergies    No Known Allergies    Intolerances        Home/ Out patient  Medications :  Home Medications:  benazepril:  (20 Dec 2018 10:19)  carbidopa-levodopa:  (20 Dec 2018 10:19)      Current Inpatient Medications :    ANTIBIOTICS:       OTHER RELEVANT MEDICATIONS :        ROS:  CONSTITUTIONAL:  Negative fever or chills, feels well, good appetite  EYES:  Negative  blurry vision or double vision  CARDIOVASCULAR:  Negative for chest pain or palpitations  RESPIRATORY:  Negative for cough, wheezing, or SOB   GASTROINTESTINAL:  Negative for nausea, vomiting, diarrhea, constipation, or abdominal pain  GENITOURINARY:  Negative frequency, urgency , dysuria or hematuria   NEUROLOGIC:  No headache, confusion, dizziness, lightheadedness  All other systems were reviewed and are negative          I&O's Detail    19 Dec 2018 07:01  -  20 Dec 2018 07:00  --------------------------------------------------------  IN:  Total IN: 0 mL    OUT:    Voided: 400 mL  Total OUT: 400 mL    Total NET: -400 mL          Physical Exam:  Vital Signs Last 24 Hrs  T(C): 37.7 (20 Dec 2018 13:40), Max: 38.2 (20 Dec 2018 00:53)  T(F): 99.9 (20 Dec 2018 13:40), Max: 100.8 (20 Dec 2018 00:53)  HR: 81 (20 Dec 2018 13:40) (74 - 94)  BP: 157/71 (20 Dec 2018 13:40) (117/67 - 161/80)  BP(mean): --  RR: 16 (20 Dec 2018 13:40) (14 - 24)  SpO2: 98% (20 Dec 2018 13:40) (95% - 100%)  Height (cm): 157.48 ( @ 13:08)  Weight (kg): 47.6 ( @ 09:11)  BMI (kg/m2): 19.2 ( @ 13:08)  BSA (m2): 1.45 ( @ 13:08)    General: well developed well nourished, in no acute distress  Eyes: sclera anicteric, pupils equal and reactive to light  ENMT: buccal mucosa moist, pharynx not injected  Neck: supple, trachea midline  Lungs: clear, no wheeze/rhonchi  Cardiovascular: regular rate and rhythm, S1 S2  Abdomen: soft, nontender, no organomegaly present, bowel sounds normal  Neurological:  alert and oriented x3, Cranial Nerves II-XII grossly intact  Skin: no increased ecchymosis/petechiae/purpura  Lymph Nodes: no palpable cervical/supraclavicular lymph nodes enlargements  Extremities: no cyanosis/clubbing/edema, shortened left leg     Labs:  Complete Blood Count (18 @ 07:21)    Nucleated RBC: 0 /100 WBCs    WBC Count: 11.07 K/uL    RBC Count: 3.08 M/uL    Hemoglobin: 8.5 g/dL    Hematocrit: 26.1 %    Mean Cell Volume: 84.7 fl    Mean Cell Hemoglobin: 27.6 pg    Mean Cell Hemoglobin Conc: 32.6 gm/dL    Red Cell Distrib Width: 13.2 %    Platelet Count - Automated: 226 K/uL                              8.3    x      )----------(  x         ( 20 Dec 2018 09:28 )               25.3      141    |  106    |  35     ----------------------------<  123        ( 20 Dec 2018 07:21 )  4.5     |  28     |  0.89     Ca    8.0        ( 20 Dec 2018 07:21 )  Mg     1.8       ( 20 Dec 2018 07:21 )        PT/INR -  13.0 sec / 1.14 ratio   ( 20 Dec 2018 07:21 )       PTT -  29.2 sec   ( 20 Dec 2018 07:21 )    Urinalysis Basic - ( 20 Dec 2018 01:29 )    Color: Yellow / Appearance: Clear / S.020 / pH: x  Gluc: x / Ketone: Negative  / Bili: Negative / Urobili: Negative   Blood: x / Protein: 25 mg/dL / Nitrite: Negative   Leuk Esterase: Small / RBC: Negative /HPF / WBC 3-5   Sq Epi: x / Non Sq Epi: Occasional / Bacteria: Occasional      Procalcitonin, Serum (12.20.18 @ 09:28)    Procalcitonin, Serum: 0.09:       RECENT CULTURES:      RADIOLOGY & ADDITIONAL STUDIES:  Xray Ankle Complete 3 Views, Left (12.19.18 @ 20:02)   FINDINGS:  No fracture or dislocation.  Ankle mortise and talar dome are maintained.  Venous phleboliths noted.    IMPRESSION: No acute fracture-subluxation demonstrated.     Xray Knee 3 Views, Left (12..18 @ 20:01) >    Impression:   No fracture or dislocation seen.   Other changes as above.     Xray Hip 2-3 Views, Left (12..18 @ 18:55) >  FINDINGS:  There is an acute, comminuted, intertrochanteric fracture of the proximal   left femur - avulsion fractures of the greater and lesser trochanters   also present.  There is varius deformity at the fracture site - with mild overlie of   proximal and distal fragments  No left hip dislocation.  The left acetabulum is maintained    IMPRESSION: Acute, proximal, left femoral fracture.    Xray Chest 1 View AP/PA (12..18 @ 18:54) >  FINDINGS:   Thoracic aortic atheromatous changes and ectasia.  Mild cardiomegaly.  No acute congestive changes.  No bilateral focal infiltrates. No pleural effusion or pneumothorax.  Degenerative changes of thoracic spine and bilateral shoulders.    IMPRESSION:   Negative for acute cardiopulmonary process.        Assessment :   83 year old female with no significant medical hx admitted with fall from home and noted ot have left hip fracture . She had low grade fever likely from fracture site hematoma . She is to undergo IM nailing left hip fracture .    Doubt any infectious etiology as her CXR and UA are negative     Plan :   - no contra indications for surgery from ID point  - observe off systemic antibiotics   - surgical prophylaxis   - follow cs results     Continue with present regime .  Appropriate use of antibiotics and adverse effects reviewed.      I have discussed the above plan of care with patient/family in detail. They expressed understanding of the treatment plan . Risks, benefits and alternatives discussed in detail. I have asked if they have any questions or concerns and appropriately addressed them to the best of my ability .      > 45 minutes spent in direct patient care reviewing  the notes, lab data/ imaging , discussion with multidisciplinary team. All questions were addressed and answered to the best of my capacity .    Thank you for allowing me to participate in the care of your patient .      Harry Huerta MD  [phone] Infectious Diseases Consult by Harry Huerta MD    Reason for Consult : fever     HPI:  SIMONE RIVERA is an 83y Female brought to ER because of left hip pain after a fall at home.  Patient was unable to get up from floor.  No LOC patient triped and fall onto left side at home. No head injury, headache, neck or back pain. Noted to have low grade fever on admission     patient briefly seen going to OR for IM nailing of left hip Fx     Past Medical & Surgical Hx:  PAST MEDICAL & SURGICAL HISTORY:      Social History-- Retired   EtOH: denies   Tobacco: denies   Drug Use: denies       FAMILY HISTORY:  No pertinent family history in first degree relatives      Allergies    No Known Allergies    Intolerances        Home/ Out patient  Medications :  Home Medications:  benazepril:  (20 Dec 2018 10:19)  carbidopa-levodopa:  (20 Dec 2018 10:19)      Current Inpatient Medications :    ANTIBIOTICS:       OTHER RELEVANT MEDICATIONS :        ROS:  CONSTITUTIONAL:  Negative fever or chills, feels well, good appetite  EYES:  Negative  blurry vision or double vision  CARDIOVASCULAR:  Negative for chest pain or palpitations  RESPIRATORY:  Negative for cough, wheezing, or SOB   GASTROINTESTINAL:  Negative for nausea, vomiting, diarrhea, constipation, or abdominal pain  GENITOURINARY:  Negative frequency, urgency , dysuria or hematuria   NEUROLOGIC:  No headache, confusion, dizziness, lightheadedness  All other systems were reviewed and are negative          I&O's Detail    19 Dec 2018 07:01  -  20 Dec 2018 07:00  --------------------------------------------------------  IN:  Total IN: 0 mL    OUT:    Voided: 400 mL  Total OUT: 400 mL    Total NET: -400 mL          Physical Exam:  Vital Signs Last 24 Hrs  T(C): 37.7 (20 Dec 2018 13:40), Max: 38.2 (20 Dec 2018 00:53)  T(F): 99.9 (20 Dec 2018 13:40), Max: 100.8 (20 Dec 2018 00:53)  HR: 81 (20 Dec 2018 13:40) (74 - 94)  BP: 157/71 (20 Dec 2018 13:40) (117/67 - 161/80)  BP(mean): --  RR: 16 (20 Dec 2018 13:40) (14 - 24)  SpO2: 98% (20 Dec 2018 13:40) (95% - 100%)  Height (cm): 157.48 ( @ 13:08)  Weight (kg): 47.6 ( @ 09:11)  BMI (kg/m2): 19.2 ( @ 13:08)  BSA (m2): 1.45 ( @ 13:08)    General: well developed well nourished, in no acute distress  Eyes: sclera anicteric, pupils equal and reactive to light  ENMT: buccal mucosa moist, pharynx not injected  Neck: supple, trachea midline  Lungs: clear, no wheeze/rhonchi  Cardiovascular: regular rate and rhythm, S1 S2  Abdomen: soft, nontender, no organomegaly present, bowel sounds normal  Neurological:  alert and oriented x3, Cranial Nerves II-XII grossly intact  Skin: no increased ecchymosis/petechiae/purpura  Lymph Nodes: no palpable cervical/supraclavicular lymph nodes enlargements  Extremities: no cyanosis/clubbing/edema, shortened left leg     Labs:  Complete Blood Count (18 @ 07:21)    Nucleated RBC: 0 /100 WBCs    WBC Count: 11.07 K/uL    RBC Count: 3.08 M/uL    Hemoglobin: 8.5 g/dL    Hematocrit: 26.1 %    Mean Cell Volume: 84.7 fl    Mean Cell Hemoglobin: 27.6 pg    Mean Cell Hemoglobin Conc: 32.6 gm/dL    Red Cell Distrib Width: 13.2 %    Platelet Count - Automated: 226 K/uL                              8.3    x      )----------(  x         ( 20 Dec 2018 09:28 )               25.3      141    |  106    |  35     ----------------------------<  123        ( 20 Dec 2018 07:21 )  4.5     |  28     |  0.89     Ca    8.0        ( 20 Dec 2018 07:21 )  Mg     1.8       ( 20 Dec 2018 07:21 )        PT/INR -  13.0 sec / 1.14 ratio   ( 20 Dec 2018 07:21 )       PTT -  29.2 sec   ( 20 Dec 2018 07:21 )    Urinalysis Basic - ( 20 Dec 2018 01:29 )    Color: Yellow / Appearance: Clear / S.020 / pH: x  Gluc: x / Ketone: Negative  / Bili: Negative / Urobili: Negative   Blood: x / Protein: 25 mg/dL / Nitrite: Negative   Leuk Esterase: Small / RBC: Negative /HPF / WBC 3-5   Sq Epi: x / Non Sq Epi: Occasional / Bacteria: Occasional      Procalcitonin, Serum (12.20.18 @ 09:28)    Procalcitonin, Serum: 0.09:       RECENT CULTURES:      RADIOLOGY & ADDITIONAL STUDIES:  Xray Ankle Complete 3 Views, Left (12.19.18 @ 20:02)   FINDINGS:  No fracture or dislocation.  Ankle mortise and talar dome are maintained.  Venous phleboliths noted.    IMPRESSION: No acute fracture-subluxation demonstrated.     Xray Knee 3 Views, Left (12..18 @ 20:01) >    Impression:   No fracture or dislocation seen.   Other changes as above.     Xray Hip 2-3 Views, Left (12..18 @ 18:55) >  FINDINGS:  There is an acute, comminuted, intertrochanteric fracture of the proximal   left femur - avulsion fractures of the greater and lesser trochanters   also present.  There is varius deformity at the fracture site - with mild overlie of   proximal and distal fragments  No left hip dislocation.  The left acetabulum is maintained    IMPRESSION: Acute, proximal, left femoral fracture.    Xray Chest 1 View AP/PA (12..18 @ 18:54) >  FINDINGS:   Thoracic aortic atheromatous changes and ectasia.  Mild cardiomegaly.  No acute congestive changes.  No bilateral focal infiltrates. No pleural effusion or pneumothorax.  Degenerative changes of thoracic spine and bilateral shoulders.    IMPRESSION:   Negative for acute cardiopulmonary process.        Assessment :   83 year old female with no significant medical hx admitted with fall from home and noted ot have left hip fracture . She had low grade fever likely from fracture site hematoma . She is to undergo IM nailing left hip fracture .    Doubt any infectious etiology as her CXR and UA are negative     Plan :   - no contra indications for surgery from ID point  - observe off systemic antibiotics   - surgical prophylaxis   - follow cs results sent from ER , No urine cs was sent .    Continue with present regime .  Appropriate use of antibiotics and adverse effects reviewed.      I have discussed the above plan of care with patient/family in detail. They expressed understanding of the treatment plan . Risks, benefits and alternatives discussed in detail. I have asked if they have any questions or concerns and appropriately addressed them to the best of my ability .      > 45 minutes spent in direct patient care reviewing  the notes, lab data/ imaging , discussion with multidisciplinary team. All questions were addressed and answered to the best of my capacity .    Thank you for allowing me to participate in the care of your patient .      Harry Huerta MD  [phone]

## 2018-12-20 NOTE — DISCHARGE NOTE ADULT - CARE PROVIDER_API CALL
Lowell Souza), Orthopaedic Surgery  [address] [place] [address]  Phone: [phone]  Fax: [phone]

## 2018-12-20 NOTE — BRIEF OPERATIVE NOTE - PROCEDURE
<<-----Click on this checkbox to enter Procedure Intramedullary nailing of hip  12/20/2018    Active  JAZMÍN

## 2018-12-20 NOTE — DISCHARGE NOTE ADULT - MEDICATION SUMMARY - MEDICATIONS TO TAKE
I will START or STAY ON the medications listed below when I get home from the hospital:    oxyCODONE 10 mg oral tablet  -- 1 tab(s) by mouth every 4 hours, As needed, Severe Pain (7 - 10)  -- Indication: For =    oxyCODONE 5 mg oral tablet  -- 1 tab(s) by mouth every 4 hours, As needed, Moderate Pain (4 - 6)  -- Indication: For =    benazepril 5 mg oral tablet  -- 1 tab(s) by mouth once a day  -- Indication: For =    magnesium hydroxide 8% oral suspension  -- 30 milliliter(s) by mouth once a day, As needed, Constipation  -- Indication: For =    tamsulosin 0.4 mg oral capsule  -- 1 cap(s) by mouth once a day (at bedtime)  -- Indication: For =    enoxaparin 40 mg/0.4 mL injectable solution  --  injectable once a day  -- Indication: For =    carbidopa-levodopa 25 mg-100 mg oral tablet  -- 1 tab(s) by mouth 3 times a day  -- Indication: For =    ferrous sulfate 325 mg (65 mg elemental iron) oral tablet  -- 1 tab(s) by mouth once a day  -- Indication: For =    docusate sodium 100 mg oral capsule  -- 1 cap(s) by mouth 3 times a day  -- Indication: For =    polyethylene glycol 3350 oral powder for reconstitution  -- 17 gram(s) by mouth once a day  -- Indication: For =    magnesium oxide 400 mg (241.3 mg elemental magnesium) oral tablet  -- 1 tab(s) by mouth once a day  -- Indication: For =    Multiple Vitamins oral tablet  -- 1 tab(s) by mouth once a day  -- Indication: For =    folic acid 1 mg oral tablet  -- 1 tab(s) by mouth once a day  -- Indication: For =    ascorbic acid 500 mg oral tablet  -- 1 tab(s) by mouth 2 times a day  -- Indication: For =

## 2018-12-20 NOTE — PROGRESS NOTE ADULT - SUBJECTIVE AND OBJECTIVE BOX
Patient seen and examined. Pain controlled.    Vital Signs Last 24 Hrs  T(C): 37.4 (20 Dec 2018 05:42), Max: 38.2 (20 Dec 2018 00:53)  T(F): 99.3 (20 Dec 2018 05:42), Max: 100.8 (20 Dec 2018 00:53)  HR: 87 (20 Dec 2018 05:42) (74 - 94)  BP: 157/78 (20 Dec 2018 05:42) (130/75 - 161/80)  BP(mean): --  RR: 20 (20 Dec 2018 05:42) (14 - 24)  SpO2: 97% (20 Dec 2018 05:42) (95% - 100%)    Physical exam  Gen: NAD  LLE: skin intact. +EHL/FHL/TA/GSC. SILT L3-S1. +Dorsalis pedis, capillary refill brisk. Compartments soft and nontender.    83F w/ L hip fx    NWB LLE, bedrest  Pain control  For OR today for L hip IMN  NPO except meds  IVF while NPO  hold chemical AC  fu am labs  awaiting medical clearance  will d/w attending

## 2018-12-21 DIAGNOSIS — G93.40 ENCEPHALOPATHY, UNSPECIFIED: ICD-10-CM

## 2018-12-21 DIAGNOSIS — D64.9 ANEMIA, UNSPECIFIED: ICD-10-CM

## 2018-12-21 LAB
ANION GAP SERPL CALC-SCNC: 10 MMOL/L — SIGNIFICANT CHANGE UP (ref 5–17)
BUN SERPL-MCNC: 40 MG/DL — HIGH (ref 7–23)
CALCIUM SERPL-MCNC: 7.8 MG/DL — LOW (ref 8.5–10.1)
CHLORIDE SERPL-SCNC: 109 MMOL/L — HIGH (ref 96–108)
CO2 SERPL-SCNC: 24 MMOL/L — SIGNIFICANT CHANGE UP (ref 22–31)
CREAT SERPL-MCNC: 1.5 MG/DL — HIGH (ref 0.5–1.3)
GLUCOSE SERPL-MCNC: 183 MG/DL — HIGH (ref 70–99)
HCT VFR BLD CALC: 20.6 % — CRITICAL LOW (ref 34.5–45)
HCT VFR BLD CALC: 27.2 % — LOW (ref 34.5–45)
HGB BLD-MCNC: 7 G/DL — CRITICAL LOW (ref 11.5–15.5)
HGB BLD-MCNC: 9.5 G/DL — LOW (ref 11.5–15.5)
MCHC RBC-ENTMCNC: 29.3 PG — SIGNIFICANT CHANGE UP (ref 27–34)
MCHC RBC-ENTMCNC: 34 GM/DL — SIGNIFICANT CHANGE UP (ref 32–36)
MCV RBC AUTO: 86.2 FL — SIGNIFICANT CHANGE UP (ref 80–100)
NRBC # BLD: 0 /100 WBCS — SIGNIFICANT CHANGE UP (ref 0–0)
PLATELET # BLD AUTO: 210 K/UL — SIGNIFICANT CHANGE UP (ref 150–400)
POTASSIUM SERPL-MCNC: 4.2 MMOL/L — SIGNIFICANT CHANGE UP (ref 3.5–5.3)
POTASSIUM SERPL-SCNC: 4.2 MMOL/L — SIGNIFICANT CHANGE UP (ref 3.5–5.3)
RBC # BLD: 2.39 M/UL — LOW (ref 3.8–5.2)
RBC # FLD: 13.4 % — SIGNIFICANT CHANGE UP (ref 10.3–14.5)
SODIUM SERPL-SCNC: 143 MMOL/L — SIGNIFICANT CHANGE UP (ref 135–145)
WBC # BLD: 12.85 K/UL — HIGH (ref 3.8–10.5)
WBC # FLD AUTO: 12.85 K/UL — HIGH (ref 3.8–10.5)

## 2018-12-21 RX ORDER — SODIUM CHLORIDE 9 MG/ML
1000 INJECTION, SOLUTION INTRAVENOUS
Qty: 0 | Refills: 0 | Status: DISCONTINUED | OUTPATIENT
Start: 2018-12-21 | End: 2018-12-21

## 2018-12-21 RX ORDER — OXYCODONE HYDROCHLORIDE 5 MG/1
1 TABLET ORAL
Qty: 0 | Refills: 0 | COMMUNITY
Start: 2018-12-21

## 2018-12-21 RX ORDER — FERROUS SULFATE 325(65) MG
1 TABLET ORAL
Qty: 0 | Refills: 0 | COMMUNITY
Start: 2018-12-21

## 2018-12-21 RX ORDER — ASCORBIC ACID 60 MG
1 TABLET,CHEWABLE ORAL
Qty: 0 | Refills: 0 | COMMUNITY
Start: 2018-12-21

## 2018-12-21 RX ORDER — DOCUSATE SODIUM 100 MG
1 CAPSULE ORAL
Qty: 0 | Refills: 0 | COMMUNITY
Start: 2018-12-21

## 2018-12-21 RX ORDER — ACETAMINOPHEN 500 MG
650 TABLET ORAL ONCE
Qty: 0 | Refills: 0 | Status: COMPLETED | OUTPATIENT
Start: 2018-12-21 | End: 2018-12-21

## 2018-12-21 RX ORDER — BENAZEPRIL HYDROCHLORIDE 40 MG/1
1 TABLET ORAL
Qty: 0 | Refills: 0 | COMMUNITY
Start: 2018-12-21

## 2018-12-21 RX ORDER — CARBIDOPA AND LEVODOPA 25; 100 MG/1; MG/1
1 TABLET ORAL
Qty: 0 | Refills: 0 | COMMUNITY
Start: 2018-12-21

## 2018-12-21 RX ORDER — FOLIC ACID 0.8 MG
1 TABLET ORAL
Qty: 0 | Refills: 0 | COMMUNITY
Start: 2018-12-21

## 2018-12-21 RX ORDER — SODIUM CHLORIDE 9 MG/ML
250 INJECTION INTRAMUSCULAR; INTRAVENOUS; SUBCUTANEOUS ONCE
Qty: 0 | Refills: 0 | Status: COMPLETED | OUTPATIENT
Start: 2018-12-21 | End: 2018-12-21

## 2018-12-21 RX ORDER — BENAZEPRIL HYDROCHLORIDE 40 MG/1
0 TABLET ORAL
Qty: 0 | Refills: 0 | COMMUNITY

## 2018-12-21 RX ORDER — MAGNESIUM OXIDE 400 MG ORAL TABLET 241.3 MG
1 TABLET ORAL
Qty: 0 | Refills: 0 | COMMUNITY
Start: 2018-12-21

## 2018-12-21 RX ORDER — MAGNESIUM OXIDE 400 MG ORAL TABLET 241.3 MG
400 TABLET ORAL DAILY
Qty: 0 | Refills: 0 | Status: DISCONTINUED | OUTPATIENT
Start: 2018-12-21 | End: 2018-12-26

## 2018-12-21 RX ORDER — CARBIDOPA AND LEVODOPA 25; 100 MG/1; MG/1
0 TABLET ORAL
Qty: 0 | Refills: 0 | COMMUNITY

## 2018-12-21 RX ORDER — ENOXAPARIN SODIUM 100 MG/ML
0 INJECTION SUBCUTANEOUS
Qty: 0 | Refills: 0 | COMMUNITY
Start: 2018-12-21

## 2018-12-21 RX ORDER — TAMSULOSIN HYDROCHLORIDE 0.4 MG/1
0.4 CAPSULE ORAL AT BEDTIME
Qty: 0 | Refills: 0 | Status: DISCONTINUED | OUTPATIENT
Start: 2018-12-21 | End: 2018-12-26

## 2018-12-21 RX ADMIN — Medication 500 MILLIGRAM(S): at 05:21

## 2018-12-21 RX ADMIN — CARBIDOPA AND LEVODOPA 1 TABLET(S): 25; 100 TABLET ORAL at 14:02

## 2018-12-21 RX ADMIN — MAGNESIUM OXIDE 400 MG ORAL TABLET 400 MILLIGRAM(S): 241.3 TABLET ORAL at 18:25

## 2018-12-21 RX ADMIN — SODIUM CHLORIDE 250 MILLILITER(S): 9 INJECTION INTRAMUSCULAR; INTRAVENOUS; SUBCUTANEOUS at 11:29

## 2018-12-21 RX ADMIN — Medication 100 MILLIGRAM(S): at 05:21

## 2018-12-21 RX ADMIN — SODIUM CHLORIDE 500 MILLILITER(S): 9 INJECTION, SOLUTION INTRAVENOUS at 00:07

## 2018-12-21 RX ADMIN — Medication 650 MILLIGRAM(S): at 11:11

## 2018-12-21 RX ADMIN — Medication 100 MILLIGRAM(S): at 14:02

## 2018-12-21 RX ADMIN — SODIUM CHLORIDE 80 MILLILITER(S): 9 INJECTION, SOLUTION INTRAVENOUS at 01:15

## 2018-12-21 RX ADMIN — Medication 500 MILLIGRAM(S): at 18:25

## 2018-12-21 RX ADMIN — Medication 1 MILLIGRAM(S): at 18:25

## 2018-12-21 RX ADMIN — CARBIDOPA AND LEVODOPA 1 TABLET(S): 25; 100 TABLET ORAL at 21:52

## 2018-12-21 RX ADMIN — TAMSULOSIN HYDROCHLORIDE 0.4 MILLIGRAM(S): 0.4 CAPSULE ORAL at 21:52

## 2018-12-21 RX ADMIN — OXYCODONE HYDROCHLORIDE 5 MILLIGRAM(S): 5 TABLET ORAL at 21:51

## 2018-12-21 RX ADMIN — Medication 100 MILLIGRAM(S): at 21:52

## 2018-12-21 RX ADMIN — Medication 325 MILLIGRAM(S): at 08:14

## 2018-12-21 RX ADMIN — Medication 650 MILLIGRAM(S): at 10:20

## 2018-12-21 RX ADMIN — Medication 1 TABLET(S): at 18:25

## 2018-12-21 RX ADMIN — CARBIDOPA AND LEVODOPA 1 TABLET(S): 25; 100 TABLET ORAL at 05:21

## 2018-12-21 RX ADMIN — Medication 100 MILLIGRAM(S): at 07:33

## 2018-12-21 NOTE — CONSULT NOTE ADULT - SUBJECTIVE AND OBJECTIVE BOX
change in mental status,   For changes in mental status ,suspect most likely this is metabolic, s/p surgery anesthesia  slight MCI made worse in hospital setting   monitor renal function  spoke to family in detail

## 2018-12-21 NOTE — CONSULT NOTE ADULT - SUBJECTIVE AND OBJECTIVE BOX
Hematology/Oncology consult     Patient is seen and examined  notes/labs reviewed  pt family is at bedside and d/w family.  consult dictated,  Job #    contact #  son/daughter----  phone #    IMPRESSION:      RECOMMENDATION:              ,thanks for courtsey of this consult,will follow this pt with you for hem/onc issue while pt is in hospital. Hematology/Oncology consult     Patient is seen and examined  notes/labs reviewed  pt daughter is at bedside and d/w patient and daughter ray  consult dictated  job # [id number]    pmd---  Dr Nestor Blanton  [phone], [facility]/[place],,,,,,,,,i called and left a message for dr blanton (with answering service)    IMPRESSION:  anemia  hx parkinson disease  s/p fall, left hip fx and s/p lt hip surgery--pod 1  anemia--getting second unit prbc this time  refuses ct a/p, refuses gi w/u, refuses invasive hem procedure    RECOMMENDATION:  anemia w/u  monitor h/h--transfuse prbc as needed for symptomatic anemia or if hb is under 7    Dr Flores ,thanks for courtesy of this consult, will follow this pt with you for hem/onc issue while pt is in hospital.

## 2018-12-21 NOTE — OCCUPATIONAL THERAPY INITIAL EVALUATION ADULT - GENERAL OBSERVATIONS, REHAB EVAL
Pt found supine in bed +IV and +bandage Left LE. Dtr Jolanta present in room. Pt appeared lethargic and has decreased H & H (7.0/20.6).

## 2018-12-21 NOTE — PROGRESS NOTE ADULT - SUBJECTIVE AND OBJECTIVE BOX
The patient was interviewed and evaluated. Daughter reports patient was very sleepy and having trouble speaking clearly. However, she is drinking well at present and is responsive and speaking. Daughter now feels she is much improved after drinking.  83y Female    T(C): 37.6 (12-21-18 @ 08:03), Max: 37.7 (12-20-18 @ 13:40)  HR: 81 (12-21-18 @ 08:03) (74 - 94)  BP: 101/60 (12-21-18 @ 08:03) (97/66 - 157/71)  RR: 16 (12-21-18 @ 08:03) (14 - 18)  SpO2: 99% (12-21-18 @ 08:03) (97% - 100%)  Wt(kg): --    Pt seen, doing well, no anesthesia complications or complaints noted or reported.   No Nausea    All questions answered    No additional recommendations.     Pain well controlled

## 2018-12-21 NOTE — PROGRESS NOTE ADULT - SUBJECTIVE AND OBJECTIVE BOX
[facility]     SUBJECTIVE: Partly confused, no card c/o. Getting pRBC's.      ALLERGIES:  Allergies    No Known Allergies    Intolerances        Vital Signs Last 24 Hrs  T(C): 37.8 (21 Dec 2018 10:07), Max: 37.8 (21 Dec 2018 10:07)  T(F): 100.1 (21 Dec 2018 10:07), Max: 100.1 (21 Dec 2018 10:07)  HR: 106 (21 Dec 2018 10:07) (79 - 106)  BP: 89/54 (21 Dec 2018 10:07) (89/54 - 157/71)  BP(mean): --  RR: 16 (21 Dec 2018 10:07) (14 - 18)  SpO2: 100% (21 Dec 2018 10:07) (97% - 100%)    PHYSICAL EXAM:  HEAD:  Atraumatic, Normocephalic  NECK: Supple and normal thyroid.  No JVD or carotid bruit.   HEART: S1, S2 normal, no S3  PULMONARY: CTA  ABDOMEN: Soft nontender and positive bowel sounds   EXTREMITIES:  no edema b/l  NEUROLOGICAL: no focal deficits     LABS:                        7.0    12.85 )-----------( 210      ( 21 Dec 2018 09:21 )             20.6     12-21    143  |  109<H>  |  40<H>  ----------------------------<  183<H>  4.2   |  24  |  1.50<H>    Ca    7.8<L>      21 Dec 2018 09:21  Mg     1.8     12-20    TPro  7.0  /  Alb  3.7  /  TBili  0.3  /  DBili  x   /  AST  17  /  ALT  11<L>  /  AlkPhos  43  12-19        PT/INR - ( 20 Dec 2018 07:21 )   PT: 13.0 sec;   INR: 1.14 ratio         PTT - ( 20 Dec 2018 07:21 )  PTT:29.2 sec  Urinalysis Basic - ( 20 Dec 2018 01:29 )    Color: Yellow / Appearance: Clear / S.020 / pH: x  Gluc: x / Ketone: Negative  / Bili: Negative / Urobili: Negative   Blood: x / Protein: 25 mg/dL / Nitrite: Negative   Leuk Esterase: Small / RBC: Negative /HPF / WBC 3-5   Sq Epi: x / Non Sq Epi: Occasional / Bacteria: Occasional      BNP  TSHThyroid Stimulating Hormone, Serum: 0.96 uIU/mL ( @ 07:21)  Thyroid Stimulating Hormone, Serum: 1.01 uIU/mL ( @ 18:59)    LIVER FUNCTIONS - ( 19 Dec 2018 18:59 )  Alb: 3.7 g/dL / Pro: 7.0 g/dL / ALK PHOS: 43 U/L / ALT: 11 U/L / AST: 17 U/L / GGT: x             EKG: NSR, no acute changes    ECHO:    IMAGING:EXAM:  XR CHEST AP OR PA 1V                            PROCEDURE DATE:  2018          INTERPRETATION:    DATE OF STUDY: 18.    PRIOR: None.    CLINICAL INDICATION: 83-yo-female who fell.    TECHNIQUE: portable chest.    FINDINGS:   Thoracic aortic atheromatous changes and ectasia.  Mild cardiomegaly.  No acute congestive changes.  No bilateral focal infiltrates. No pleural effusion or pneumothorax.  Degenerative changes of thoracic spine and bilateral shoulders.    IMPRESSION:   Negative for acute cardiopulmonary process.      MARTINE GLEASON M.D., ATTENDING RADIOLOGIST  This document has been electronically signed. Dec 19 2018  7:25PM      MEDICATIONS  (STANDING):  ascorbic acid 500 milliGRAM(s) Oral two times a day  carbidopa/levodopa  25/100 1 Tablet(s) Oral three times a day  docusate sodium 100 milliGRAM(s) Oral three times a day  enoxaparin Injectable 40 milliGRAM(s) SubCutaneous every 24 hours  ferrous    sulfate 325 milliGRAM(s) Oral three times a day with meals  folic acid 1 milliGRAM(s) Oral daily  magnesium oxide 400 milliGRAM(s) Oral daily  multivitamin 1 Tablet(s) Oral daily    MEDICATIONS  (PRN):  acetaminophen   Tablet .. 650 milliGRAM(s) Oral every 6 hours PRN Temp greater or equal to 38C (100.4F), Mild Pain (1 - 3)  diphenhydrAMINE 25 milliGRAM(s) Oral at bedtime PRN Insomnia  HYDROmorphone  Injectable 0.5 milliGRAM(s) IV Push every 6 hours PRN breakthrough pain  ondansetron Injectable 4 milliGRAM(s) IV Push every 6 hours PRN Nausea and/or Vomiting  oxyCODONE    IR 10 milliGRAM(s) Oral every 4 hours PRN Severe Pain (7 - 10)  oxyCODONE    IR 5 milliGRAM(s) Oral every 4 hours PRN Moderate Pain (4 - 6)

## 2018-12-21 NOTE — OCCUPATIONAL THERAPY INITIAL EVALUATION ADULT - RANGE OF MOTION EXAMINATION, UPPER EXTREMITY
bilateral UE Active Assistive ROM was WFL  (within functional limits)/Arthritic changes noted b/l hands. Fine motor skills: appear WFL's to minimally impaired

## 2018-12-21 NOTE — PROGRESS NOTE ADULT - SUBJECTIVE AND OBJECTIVE BOX
ID Progress note     Name: SIMONE RIVERA  Age: 83y  Gender: Female  MRN: [id number]    Interval History-- Events noted, doing well, POD # 1 , s/p IMN left hip fx. Awake but very confused, daughter at bedside .She had to be straight cath early this am 700 cc urine, since then has not voided , bladder scan shows  cc . Getting blood transfusion for severe anemia .   Notes reviewed    Past Medical History--  No pertinent past medical history      For details regarding the patient's social history, family history, and other miscellaneous elements, please refer the initial infectious diseases consultation and/or the admitting history and physical examination for this admission.    Allergies--  Allergies    No Known Allergies    Intolerances        Medications--  Antibiotics:    Immunologic:    Other:  acetaminophen   Tablet .. PRN  ascorbic acid  carbidopa/levodopa  25/100  diphenhydrAMINE PRN  docusate sodium  enoxaparin Injectable  ferrous    sulfate  folic acid  HYDROmorphone  Injectable PRN  magnesium oxide  multivitamin  ondansetron Injectable PRN  oxyCODONE    IR PRN  oxyCODONE    IR PRN      Review of Systems--  Review of systems unable to be obtained secondary to clinical condition.    Physical Examination--    Vital Signs: T(F): 100.1 (12-21-18 @ 10:07), Max: 100.1 (12-21-18 @ 10:07)  HR: 106 (12-21-18 @ 10:07)  BP: 89/54 (12-21-18 @ 10:07)  RR: 16 (12-21-18 @ 10:07)  SpO2: 100% (12-21-18 @ 10:07)  Wt(kg): --  General: Nontoxic-appearing Female in no acute distress.  HEENT: AT/NC. PERRL. EOMI. Anicteric. Conjunctiva pink and moist. Oropharynx clear. Dentition fair.  Neck: Not rigid. No sense of mass.  Nodes: None palpable.  Lungs: Clear bilaterally without rales, wheezing or rhonchi  Heart: Regular rate and rhythm. No Murmur. No rub. No gallop. No palpable thrill.  Abdomen: Bowel sounds present and normoactive. Soft. Nondistended. Nontender. No sense of mass. No organomegaly.  Back: No spinal tenderness. No costovertebral angle tenderness.   Extremities: No cyanosis or clubbing. No edema.   Skin: Warm. Dry. Good turgor. No rash. No vasculitic stigmata.  Psychiatric: Appropriate affect and mood for situation.         Laboratory Studies--  CBC                        7.0    12.85 )-----------( 210      ( 21 Dec 2018 09:21 )             20.6       Chemistries  12-21    143  |  109<H>  |  40<H>  ----------------------------<  183<H>  4.2   |  24  |  1.50<H>    Ca    7.8<L>      21 Dec 2018 09:21  Mg     1.8     12-20    TPro  7.0  /  Alb  3.7  /  TBili  0.3  /  DBili  x   /  AST  17  /  ALT  11<L>  /  AlkPhos  43  12-19      Culture Data    Culture - Blood (collected 20 Dec 2018 12:59)  Source: .Blood Blood  Preliminary Report (21 Dec 2018 13:01):    No growth to date.    Culture - Blood (collected 20 Dec 2018 12:59)  Source: .Blood Blood  Preliminary Report (21 Dec 2018 13:01):    No growth to date.        Radiology:  Xray Ankle Complete 3 Views, Left (12.19.18 @ 20:02)   FINDINGS:  No fracture or dislocation.  Ankle mortise and talar dome are maintained.  Venous phleboliths noted.    IMPRESSION: No acute fracture-subluxation demonstrated.     Xray Knee 3 Views, Left (12.19.18 @ 20:01) >    Impression:   No fracture or dislocation seen.   Other changes as above.     Xray Hip 2-3 Views, Left (12.19.18 @ 18:55) >  FINDINGS:  There is an acute, comminuted, intertrochanteric fracture of the proximal   left femur - avulsion fractures of the greater and lesser trochanters   also present.  There is varius deformity at the fracture site - with mild overlie of   proximal and distal fragments  No left hip dislocation.  The left acetabulum is maintained    IMPRESSION: Acute, proximal, left femoral fracture.    Xray Chest 1 View AP/PA (12.19.18 @ 18:54) >  FINDINGS:   Thoracic aortic atheromatous changes and ectasia.  Mild cardiomegaly.  No acute congestive changes.  No bilateral focal infiltrates. No pleural effusion or pneumothorax.  Degenerative changes of thoracic spine and bilateral shoulders.    IMPRESSION:   Negative for acute cardiopulmonary process.        Assessment :   83 year old female with no significant medical hx admitted with fall from home and noted ot have left hip fracture . She had low grade fever likely from fracture site hematoma . She is to undergo IM nailing left hip fracture .    Doubt any infectious etiology as her CXR and UA are negative     Post op anemia related to fx surgery .    She is also confused post op ? post anaesthetic effect    Plan :   - send UA and urine cs if straight cath for retention of urine   - observe off systemic antibiotics   - surgical prophylaxis   - trend CBC    Continue with present regime .  Appropriate use of antibiotics and adverse effects reviewed.    I have discussed the above plan of care with patient's family in detail. They expressed understanding of the treatment plan . Risks, benefits and alternatives discussed in detail. I have asked if they have any questions or concerns and appropriately addressed them to the best of my ability .      > 35 minutes spent in direct patient care reviewing  the notes, lab data/ imaging , discussion with multidisciplinary team. All questions were addressed and answered to the best of my capacity .    Thank you for allowing me to participate in the care of your patient .        Harry Huerta MD  [phone]

## 2018-12-21 NOTE — PROGRESS NOTE ADULT - ASSESSMENT
*S/P ORIF L Hip: Ortho following  *HTN: stable at present  *Parkinson's: per primary team  *Anemia: trend H/H, per primary team  - stable cardiacwise  - spoke with Family  - no cardiac issues-> will sign off. Thanks.

## 2018-12-21 NOTE — PROGRESS NOTE ADULT - SUBJECTIVE AND OBJECTIVE BOX
Patient is a 83y old  Female who presents with a chief complaint of Left hip fracture (21 Dec 2018 15:32)      INTERVAL /OVERNIGHT EVENTS: resting comfortsably    MEDICATIONS  (STANDING):  ascorbic acid 500 milliGRAM(s) Oral two times a day  carbidopa/levodopa  25/100 1 Tablet(s) Oral three times a day  docusate sodium 100 milliGRAM(s) Oral three times a day  enoxaparin Injectable 40 milliGRAM(s) SubCutaneous every 24 hours  ferrous    sulfate 325 milliGRAM(s) Oral three times a day with meals  folic acid 1 milliGRAM(s) Oral daily  magnesium oxide 400 milliGRAM(s) Oral daily  multivitamin 1 Tablet(s) Oral daily  tamsulosin 0.4 milliGRAM(s) Oral at bedtime    MEDICATIONS  (PRN):  acetaminophen   Tablet .. 650 milliGRAM(s) Oral every 6 hours PRN Temp greater or equal to 38C (100.4F), Mild Pain (1 - 3)  diphenhydrAMINE 25 milliGRAM(s) Oral at bedtime PRN Insomnia  HYDROmorphone  Injectable 0.5 milliGRAM(s) IV Push every 6 hours PRN breakthrough pain  ondansetron Injectable 4 milliGRAM(s) IV Push every 6 hours PRN Nausea and/or Vomiting  oxyCODONE    IR 10 milliGRAM(s) Oral every 4 hours PRN Severe Pain (7 - 10)  oxyCODONE    IR 5 milliGRAM(s) Oral every 4 hours PRN Moderate Pain (4 - 6)      Allergies    No Known Allergies    Intolerances        REVIEW OF SYSTEMS: unable to obtain    Vital Signs Last 24 Hrs  T(C): 37.3 (21 Dec 2018 16:45), Max: 37.8 (21 Dec 2018 10:07)  T(F): 99.1 (21 Dec 2018 16:45), Max: 100.1 (21 Dec 2018 10:07)  HR: 87 (21 Dec 2018 16:45) (79 - 106)  BP: 125/79 (21 Dec 2018 16:45) (89/54 - 125/79)  BP(mean): --  RR: 17 (21 Dec 2018 16:45) (15 - 18)  SpO2: 99% (21 Dec 2018 16:45) (97% - 100%)    PHYSICAL EXAM:  GENERAL: NAD, well-groomed, well-developed  HEAD:  Atraumatic, Normocephalic  EYES: EOMI, PERRLA, conjunctiva and sclera clear  ENMT: No tonsillar erythema, exudates, or enlargement; Moist mucous membranes, Good dentition, No lesions  NECK: Supple, No JVD, Normal thyroid  NERVOUS SYSTEM:  Motor Strength 5/5 B/L upper and lower extremities; DTRs 2+ intact and symmetric  CHEST/LUNG: Clear to auscultation bilaterally; No rales, rhonchi, wheezing, or rubs  HEART: Regular rate and rhythm; No murmurs, rubs, or gallops  ABDOMEN: Soft, Nontender, Nondistended; Bowel sounds present  EXTREMITIES:  2+ Peripheral Pulses, No clubbing, cyanosis, or edema  LYMPH: No lymphadenopathy noted  SKIN: No rashes or lesions    LABS:                        7.0    12.85 )-----------( 210      ( 21 Dec 2018 09:21 )             20.6     21 Dec 2018 09:21    143    |  109    |  40     ----------------------------<  183    4.2     |  24     |  1.50     Ca    7.8        21 Dec 2018 09:21      PT/INR - ( 20 Dec 2018 07:21 )   PT: 13.0 sec;   INR: 1.14 ratio         PTT - ( 20 Dec 2018 07:21 )  PTT:29.2 sec  Urinalysis Basic - ( 20 Dec 2018 01:29 )    Color: Yellow / Appearance: Clear / S.020 / pH: x  Gluc: x / Ketone: Negative  / Bili: Negative / Urobili: Negative   Blood: x / Protein: 25 mg/dL / Nitrite: Negative   Leuk Esterase: Small / RBC: Negative /HPF / WBC 3-5   Sq Epi: x / Non Sq Epi: Occasional / Bacteria: Occasional      CAPILLARY BLOOD GLUCOSE      POCT Blood Glucose.: 142 mg/dL (20 Dec 2018 23:43)      RADIOLOGY & ADDITIONAL TESTS:    Notes Reviewed:  [x ] YES  [ ] NO    Care Discussed with Consultants/Other Providers [x ] YES  [ ] NO

## 2018-12-21 NOTE — OCCUPATIONAL THERAPY INITIAL EVALUATION ADULT - PERTINENT HX OF CURRENT PROBLEM, REHAB EVAL
84 y/o female s/p Left hip IM nailing 12/20/18 due to hip fx s/p fall at home.  Pt speaks Azeri, though appears to understand some English. Mi Stover served as  as per pt request.

## 2018-12-21 NOTE — PROGRESS NOTE ADULT - SUBJECTIVE AND OBJECTIVE BOX
Pt seen and examined at bedside. Pain well controlled. Denies CP/Dyspnea.  Libyan speaking as primary lang but able to follow simple commands.     Vital Signs Last 24 Hrs  T(C): 36.7 (21 Dec 2018 05:20), Max: 38 (20 Dec 2018 07:38)  T(F): 98.1 (21 Dec 2018 05:20), Max: 100.4 (20 Dec 2018 07:38)  HR: 89 (21 Dec 2018 05:20) (74 - 94)  BP: 102/62 (21 Dec 2018 05:20) (97/66 - 157/71)  BP(mean): --  RR: 17 (21 Dec 2018 05:20) (14 - 18)  SpO2: 97% (21 Dec 2018 05:20) (97% - 100%)    Physical Exam  Gen: NAD  LLE:   Dressing c/d/i, hematoma palpable along distal bandage visualized, no skin changes.  +ehl/fhl/ta/gs function  L2-S1 silt  Dp/pt pulse intact  No calf ttp  Compartments soft    A/P: 83y Female sp L Hip IM Nail POD 1  Continue post op abx  FU AM labs, may need transfusion this AM  Pain control  DVT ppx- Lovenox to begin today  PT/WBAT/OOB  Ice/elevate  Medical management appreciated  Incentive spirometry  Dispo planning

## 2018-12-21 NOTE — OCCUPATIONAL THERAPY INITIAL EVALUATION ADULT - ADDITIONAL COMMENTS
Daughter Jolanta Edwards served as  as per patient request and provided social hx due to patient lethargic and dtr provides assistance for patient at home. Pt lives with her spouse and son in a ranch style house with no steps to enter and no steps inside. Pt has a bathtub with sliding doors. Pt owns a cane and transport chair. Pt ambulated using a cane indoors and a cane with hand held assist outdoors. Dtr Jolanta assisted patient with bathing and tub transfers. Family assists patient with IADL's (cooking, laundry, cleaning). Pt does not drive. Dtr reports that patient has had recent weight loss (9 pounds in 3 months).

## 2018-12-21 NOTE — OCCUPATIONAL THERAPY INITIAL EVALUATION ADULT - BED MOBILITY TRAINING, PT EVAL
Patient will perform bed mobility skills (supine to sit and sit to supine) with maximal assistance in 2-4 sessions.

## 2018-12-22 LAB
ANION GAP SERPL CALC-SCNC: 6 MMOL/L — SIGNIFICANT CHANGE UP (ref 5–17)
APPEARANCE UR: CLEAR — SIGNIFICANT CHANGE UP
BACTERIA # UR AUTO: ABNORMAL
BASOPHILS # BLD AUTO: 0.02 K/UL — SIGNIFICANT CHANGE UP (ref 0–0.2)
BASOPHILS NFR BLD AUTO: 0.2 % — SIGNIFICANT CHANGE UP (ref 0–2)
BILIRUB UR-MCNC: NEGATIVE — SIGNIFICANT CHANGE UP
BUN SERPL-MCNC: 43 MG/DL — HIGH (ref 7–23)
CALCIUM SERPL-MCNC: 7.6 MG/DL — LOW (ref 8.5–10.1)
CHLORIDE SERPL-SCNC: 110 MMOL/L — HIGH (ref 96–108)
CO2 SERPL-SCNC: 27 MMOL/L — SIGNIFICANT CHANGE UP (ref 22–31)
COLOR SPEC: YELLOW — SIGNIFICANT CHANGE UP
CREAT SERPL-MCNC: 1 MG/DL — SIGNIFICANT CHANGE UP (ref 0.5–1.3)
CULTURE RESULTS: NO GROWTH — SIGNIFICANT CHANGE UP
DIFF PNL FLD: ABNORMAL
EOSINOPHIL # BLD AUTO: 0.04 K/UL — SIGNIFICANT CHANGE UP (ref 0–0.5)
EOSINOPHIL NFR BLD AUTO: 0.4 % — SIGNIFICANT CHANGE UP (ref 0–6)
EPI CELLS # UR: SIGNIFICANT CHANGE UP
FERRITIN SERPL-MCNC: 216 NG/ML — HIGH (ref 15–150)
FOLATE SERPL-MCNC: >20 NG/ML — SIGNIFICANT CHANGE UP
GLUCOSE SERPL-MCNC: 111 MG/DL — HIGH (ref 70–99)
GLUCOSE UR QL: NEGATIVE — SIGNIFICANT CHANGE UP
HAPTOGLOB SERPL-MCNC: 176 MG/DL — SIGNIFICANT CHANGE UP (ref 34–200)
HCT VFR BLD CALC: 26 % — LOW (ref 34.5–45)
HGB BLD-MCNC: 9 G/DL — LOW (ref 11.5–15.5)
IMM GRANULOCYTES NFR BLD AUTO: 0.4 % — SIGNIFICANT CHANGE UP (ref 0–1.5)
IRON SATN MFR SERPL: 14 UG/DL — LOW (ref 30–160)
IRON SATN MFR SERPL: 7 % — LOW (ref 14–50)
KETONES UR-MCNC: NEGATIVE — SIGNIFICANT CHANGE UP
LDH SERPL L TO P-CCNC: 227 U/L — SIGNIFICANT CHANGE UP (ref 50–242)
LEUKOCYTE ESTERASE UR-ACNC: ABNORMAL
LYMPHOCYTES # BLD AUTO: 1.19 K/UL — SIGNIFICANT CHANGE UP (ref 1–3.3)
LYMPHOCYTES # BLD AUTO: 13.1 % — SIGNIFICANT CHANGE UP (ref 13–44)
MCHC RBC-ENTMCNC: 28.9 PG — SIGNIFICANT CHANGE UP (ref 27–34)
MCHC RBC-ENTMCNC: 34.6 GM/DL — SIGNIFICANT CHANGE UP (ref 32–36)
MCV RBC AUTO: 83.6 FL — SIGNIFICANT CHANGE UP (ref 80–100)
MONOCYTES # BLD AUTO: 1.03 K/UL — HIGH (ref 0–0.9)
MONOCYTES NFR BLD AUTO: 11.3 % — SIGNIFICANT CHANGE UP (ref 2–14)
NEUTROPHILS # BLD AUTO: 6.79 K/UL — SIGNIFICANT CHANGE UP (ref 1.8–7.4)
NEUTROPHILS NFR BLD AUTO: 74.6 % — SIGNIFICANT CHANGE UP (ref 43–77)
NITRITE UR-MCNC: NEGATIVE — SIGNIFICANT CHANGE UP
NRBC # BLD: 0 /100 WBCS — SIGNIFICANT CHANGE UP (ref 0–0)
PH UR: 5 — SIGNIFICANT CHANGE UP (ref 5–8)
PLATELET # BLD AUTO: 159 K/UL — SIGNIFICANT CHANGE UP (ref 150–400)
POTASSIUM SERPL-MCNC: 4.2 MMOL/L — SIGNIFICANT CHANGE UP (ref 3.5–5.3)
POTASSIUM SERPL-SCNC: 4.2 MMOL/L — SIGNIFICANT CHANGE UP (ref 3.5–5.3)
PROT SERPL-MCNC: 4.7 G/DL — LOW (ref 6–8.3)
PROT SERPL-MCNC: 4.7 G/DL — LOW (ref 6–8.3)
PROT UR-MCNC: 25 MG/DL
RBC # BLD: 3.11 M/UL — LOW (ref 3.8–5.2)
RBC # BLD: 3.11 M/UL — LOW (ref 3.8–5.2)
RBC # FLD: 13.6 % — SIGNIFICANT CHANGE UP (ref 10.3–14.5)
RBC CASTS # UR COMP ASSIST: ABNORMAL /HPF (ref 0–4)
RETICS #: 48.8 K/UL — SIGNIFICANT CHANGE UP (ref 25–125)
RETICS/RBC NFR: 1.6 % — SIGNIFICANT CHANGE UP (ref 0.5–2.5)
SODIUM SERPL-SCNC: 143 MMOL/L — SIGNIFICANT CHANGE UP (ref 135–145)
SP GR SPEC: 1.02 — SIGNIFICANT CHANGE UP (ref 1.01–1.02)
SPECIMEN SOURCE: SIGNIFICANT CHANGE UP
TIBC SERPL-MCNC: 212 UG/DL — LOW (ref 220–430)
UIBC SERPL-MCNC: 198 UG/DL — SIGNIFICANT CHANGE UP (ref 110–370)
UROBILINOGEN FLD QL: NEGATIVE — SIGNIFICANT CHANGE UP
VIT B12 SERPL-MCNC: 541 PG/ML — SIGNIFICANT CHANGE UP (ref 232–1245)
WBC # BLD: 9.11 K/UL — SIGNIFICANT CHANGE UP (ref 3.8–10.5)
WBC # FLD AUTO: 9.11 K/UL — SIGNIFICANT CHANGE UP (ref 3.8–10.5)
WBC UR QL: ABNORMAL

## 2018-12-22 PROCEDURE — 70450 CT HEAD/BRAIN W/O DYE: CPT | Mod: 26

## 2018-12-22 RX ORDER — IRON SUCROSE 20 MG/ML
100 INJECTION, SOLUTION INTRAVENOUS EVERY 24 HOURS
Qty: 0 | Refills: 0 | Status: COMPLETED | OUTPATIENT
Start: 2018-12-22 | End: 2018-12-24

## 2018-12-22 RX ADMIN — Medication 500 MILLIGRAM(S): at 17:16

## 2018-12-22 RX ADMIN — CARBIDOPA AND LEVODOPA 1 TABLET(S): 25; 100 TABLET ORAL at 06:44

## 2018-12-22 RX ADMIN — Medication 100 MILLIGRAM(S): at 06:44

## 2018-12-22 RX ADMIN — Medication 1 TABLET(S): at 12:30

## 2018-12-22 RX ADMIN — Medication 100 MILLIGRAM(S): at 21:09

## 2018-12-22 RX ADMIN — Medication 1 MILLIGRAM(S): at 12:30

## 2018-12-22 RX ADMIN — CARBIDOPA AND LEVODOPA 1 TABLET(S): 25; 100 TABLET ORAL at 21:09

## 2018-12-22 RX ADMIN — Medication 325 MILLIGRAM(S): at 17:16

## 2018-12-22 RX ADMIN — IRON SUCROSE 100 MILLIGRAM(S): 20 INJECTION, SOLUTION INTRAVENOUS at 14:26

## 2018-12-22 RX ADMIN — MAGNESIUM OXIDE 400 MG ORAL TABLET 400 MILLIGRAM(S): 241.3 TABLET ORAL at 12:30

## 2018-12-22 RX ADMIN — Medication 325 MILLIGRAM(S): at 08:45

## 2018-12-22 RX ADMIN — TAMSULOSIN HYDROCHLORIDE 0.4 MILLIGRAM(S): 0.4 CAPSULE ORAL at 21:09

## 2018-12-22 RX ADMIN — Medication 500 MILLIGRAM(S): at 06:44

## 2018-12-22 RX ADMIN — Medication 100 MILLIGRAM(S): at 14:26

## 2018-12-22 RX ADMIN — CARBIDOPA AND LEVODOPA 1 TABLET(S): 25; 100 TABLET ORAL at 14:26

## 2018-12-22 RX ADMIN — Medication 650 MILLIGRAM(S): at 01:32

## 2018-12-22 RX ADMIN — ENOXAPARIN SODIUM 40 MILLIGRAM(S): 100 INJECTION SUBCUTANEOUS at 08:45

## 2018-12-22 RX ADMIN — Medication 325 MILLIGRAM(S): at 12:30

## 2018-12-22 RX ADMIN — Medication 650 MILLIGRAM(S): at 02:00

## 2018-12-22 NOTE — PROGRESS NOTE ADULT - SUBJECTIVE AND OBJECTIVE BOX
Patient is a 83y old  Female who presents with a chief complaint of Left hip fracture (22 Dec 2018 11:45)      INTERVAL HPI/OVERNIGHT EVENTS: Patient seen and examined. NAD. No complaints.    Vital Signs Last 24 Hrs  T(C): 37.4 (22 Dec 2018 07:13), Max: 38.3 (22 Dec 2018 01:33)  T(F): 99.3 (22 Dec 2018 07:13), Max: 100.9 (22 Dec 2018 01:33)  HR: 81 (22 Dec 2018 07:13) (79 - 95)  BP: 104/60 (22 Dec 2018 09:46) (96/57 - 125/79)  BP(mean): --  RR: 17 (22 Dec 2018 07:13) (16 - 17)  SpO2: 97% (22 Dec 2018 07:13) (97% - 99%)        143  |  110<H>  |  43<H>  ----------------------------<  111<H>  4.2   |  27  |  1.00    Ca    7.6<L>      22 Dec 2018 08:19    TPro  4.7<L>  /  Alb  x   /  TBili  x   /  DBili  x   /  AST  x   /  ALT  x   /  AlkPhos  x                             9.0    9.11  )-----------( 159      ( 22 Dec 2018 08:19 )             26.0       CAPILLARY BLOOD GLUCOSE      POCT Blood Glucose.: 136 mg/dL (22 Dec 2018 07:47)    Urinalysis Basic - ( 22 Dec 2018 09:58 )    Color: Yellow / Appearance: Clear / S.020 / pH: x  Gluc: x / Ketone: Negative  / Bili: Negative / Urobili: Negative   Blood: x / Protein: 25 mg/dL / Nitrite: Negative   Leuk Esterase: Small / RBC: 3-5 /HPF / WBC 11-25   Sq Epi: x / Non Sq Epi: Occasional / Bacteria: Few              acetaminophen   Tablet .. 650 milliGRAM(s) Oral every 6 hours PRN  ascorbic acid 500 milliGRAM(s) Oral two times a day  carbidopa/levodopa  25/100 1 Tablet(s) Oral three times a day  diphenhydrAMINE 25 milliGRAM(s) Oral at bedtime PRN  docusate sodium 100 milliGRAM(s) Oral three times a day  enoxaparin Injectable 40 milliGRAM(s) SubCutaneous every 24 hours  ferrous    sulfate 325 milliGRAM(s) Oral three times a day with meals  folic acid 1 milliGRAM(s) Oral daily  HYDROmorphone  Injectable 0.5 milliGRAM(s) IV Push every 6 hours PRN  iron sucrose Injectable 100 milliGRAM(s) IV Push every 24 hours  magnesium oxide 400 milliGRAM(s) Oral daily  multivitamin 1 Tablet(s) Oral daily  ondansetron Injectable 4 milliGRAM(s) IV Push every 6 hours PRN  oxyCODONE    IR 10 milliGRAM(s) Oral every 4 hours PRN  oxyCODONE    IR 5 milliGRAM(s) Oral every 4 hours PRN  tamsulosin 0.4 milliGRAM(s) Oral at bedtime              REVIEW OF SYSTEMS:  CONSTITUTIONAL: No fever, no weight loss, or no fatigue  NECK: No pain, no stiffness  RESPIRATORY: No cough, no wheezing, no chills, no hemoptysis, No shortness of breath  CARDIOVASCULAR: No chest pain, no palpitations, no dizziness, no leg swelling  GASTROINTESTINAL: No abdominal pain. No nausea, no vomiting, no hematemesis; No diarrhea, no constipation. No melena, no hematochezia.  GENITOURINARY: No dysuria, no frequency, no hematuria, no incontinence  NEUROLOGICAL: No headaches, no loss of strength, no numbness, no tremors; confused  SKIN: No itching, no burning  MUSCULOSKELETAL: No joint pain, no swelling; No muscle, no back, no extremity pain  PSYCHIATRIC: No depression, no mood swings,   HEME/LYMPH: No easy bruising, no bleeding gums  ALLERY AND IMMUNOLOGIC: No hives       Consultant(s) Notes Reviewed:  [X] YES  [ ] NO    PHYSICAL EXAM:  GENERAL: NAD  HEAD:  Atraumatic, Normocephalic  EYES: EOMI, PERRLA, conjunctiva and sclera clear  ENMT: No tonsillar erythema, exudates, or enlargement; Moist mucous membranes  NECK: Supple, No JVD  NERVOUS SYSTEM:  Awake & alert  CHEST/LUNG: Clear to auscultation bilaterally; No rales, rhonchi, wheezing,  HEART: Regular rate and rhythm  ABDOMEN: Soft, Nontender, Nondistended; Bowel sounds present  EXTREMITIES:  No clubbing, cyanosis, or edema  LYMPH: No lymphadenopathy noted  SKIN: No rashes      Advanced care planning discussed with patient/family [X] YES   [ ] NO    Advanced care planning discussed with patient/family. Advanced care planning forms reviewed/discussed/completed. 20 minutes spent.

## 2018-12-22 NOTE — PROGRESS NOTE ADULT - SUBJECTIVE AND OBJECTIVE BOX
Neurology Follow up note    SIMONE [id number]    HPI:  SIMONE RIVERA is an 82 YO Female brought to ER because of left hip pain after a fall at home.  Patient was unable to get up from floor after a fall due to pain in left hip.  No LOC patient tripped and fall onto left side at home. No head injury, headache, neck or back pain. (19 Dec 2018 22:09)      Interval History - reported confusion/ ?facial asymmetry,     Patient is seen, chart was reviewed and case was discussed with the treatment team.  Pt is not in any distress.   Lying on bed comfortably.   No events reported overnight.   No clinical seizure was reported.  Sitting on chair bed comfortably.    is at bedside.    Vital Signs Last 24 Hrs  T(C): 37.4 (22 Dec 2018 07:13), Max: 38.3 (22 Dec 2018 01:33)  T(F): 99.3 (22 Dec 2018 07:13), Max: 100.9 (22 Dec 2018 01:33)  HR: 81 (22 Dec 2018 07:13) (79 - 95)  BP: 104/60 (22 Dec 2018 09:46) (96/57 - 125/79)  BP(mean): --  RR: 17 (22 Dec 2018 07:13) (16 - 17)  SpO2: 97% (22 Dec 2018 07:13) (97% - 99%)        REVIEW OF SYSTEMS:    Constitutional: No fever, weight loss or fatigue  Eyes: No eye pain, visual disturbances, or discharge  ENT:  No difficulty hearing, tinnitus, vertigo; No sinus or throat pain  Neck: No pain or stiffness  Respiratory: No cough, wheezing, chills or hemoptysis  Cardiovascular: No chest pain, palpitations, shortness of breath, dizziness or leg swelling  Gastrointestinal: No abdominal or epigastric pain. No nausea, vomiting or hematemesis; No diarrhea or constipation. No melena or hematochezia.  Genitourinary: No dysuria, frequency, hematuria or incontinence  Neurological: No headaches,   Psychiatric: No depression, anxiety, mood swings or difficulty sleeping  Skin: No itching, burning, rashes or lesions   Lymph Nodes: No enlarged glands  Endocrine: No heat or cold intolerance; No hair loss, No h/o diabetes or thyroid dysfunction  Allergy and Immunologic: No hives or eczema    On Neurological Examination:    Mental Status - Pt is alert, awake, oriented X2 . Follows commands well and able to answer questions appropriately  Mood and affect  normal    Speech -  dysarthric,    Cranial Nerves - Pupils 3 mm equal and reactive to light, extraocular eye movements intact. Pt has no visual field deficit.  Pt has  right facial asymmetry. Facial sensation is intact.Tongue - is in midline.    Muscle tone - is normal all over. Moves all extremities equally. No asymmetry is seen.      Motor Exam - no pronator drift.    Sensory Exam -  Pt withdraws all extremities equally on stimulation. No asymmetry seen. No complaints of tingling, numbness.        Finger to nose: normal/      Deep tendon Reflexes - 2 plus all over.         Neck Supple -  Yes.     MEDICATIONS    acetaminophen   Tablet .. 650 milliGRAM(s) Oral every 6 hours PRN  ascorbic acid 500 milliGRAM(s) Oral two times a day  carbidopa/levodopa  25/100 1 Tablet(s) Oral three times a day  diphenhydrAMINE 25 milliGRAM(s) Oral at bedtime PRN  docusate sodium 100 milliGRAM(s) Oral three times a day  enoxaparin Injectable 40 milliGRAM(s) SubCutaneous every 24 hours  ferrous    sulfate 325 milliGRAM(s) Oral three times a day with meals  folic acid 1 milliGRAM(s) Oral daily  HYDROmorphone  Injectable 0.5 milliGRAM(s) IV Push every 6 hours PRN  iron sucrose Injectable 100 milliGRAM(s) IV Push every 24 hours  magnesium oxide 400 milliGRAM(s) Oral daily  multivitamin 1 Tablet(s) Oral daily  ondansetron Injectable 4 milliGRAM(s) IV Push every 6 hours PRN  oxyCODONE    IR 10 milliGRAM(s) Oral every 4 hours PRN  oxyCODONE    IR 5 milliGRAM(s) Oral every 4 hours PRN  tamsulosin 0.4 milliGRAM(s) Oral at bedtime      Allergies    No Known Allergies    Intolerances        LABS:  CBC Full  -  ( 22 Dec 2018 08:19 )  WBC Count : 9.11 K/uL  Hemoglobin : 9.0 g/dL  Hematocrit : 26.0 %  Platelet Count - Automated : 159 K/uL  Mean Cell Volume : 83.6 fl  Mean Cell Hemoglobin : 28.9 pg  Mean Cell Hemoglobin Concentration : 34.6 gm/dL  Auto Neutrophil # : 6.79 K/uL  Auto Lymphocyte # : 1.19 K/uL  Auto Monocyte # : 1.03 K/uL  Auto Eosinophil # : 0.04 K/uL  Auto Basophil # : 0.02 K/uL  Auto Neutrophil % : 74.6 %  Auto Lymphocyte % : 13.1 %  Auto Monocyte % : 11.3 %  Auto Eosinophil % : 0.4 %  Auto Basophil % : 0.2 %    Urinalysis Basic - ( 22 Dec 2018 09:58 )    Color: Yellow / Appearance: Clear / S.020 / pH: x  Gluc: x / Ketone: Negative  / Bili: Negative / Urobili: Negative   Blood: x / Protein: 25 mg/dL / Nitrite: Negative   Leuk Esterase: Small / RBC: 3-5 /HPF / WBC -   Sq Epi: x / Non Sq Epi: Occasional / Bacteria: Few          143  |  110<H>  |  43<H>  ----------------------------<  111<H>  4.2   |  27  |  1.00    Ca    7.6<L>      22 Dec 2018 08:19    TPro  4.7<L>  /  Alb  x   /  TBili  x   /  DBili  x   /  AST  x   /  ALT  x   /  AlkPhos  x       Hemoglobin A1C:     Vitamin B12 Vitamin B12, Serum: 541 pg/mL ( @ 09:56)      RADIOLOGY    ASSESSMENT AND PLAN:      sp mechanical fall.  sp left hemiarthroplasty   ams/facial asymmetry,]    ct head to r/o cva  Physical therapy evaluation.  OOB to chair/ambulation with assistance only.  Advanced care planning was discussed with family.  Pain is accessed and addressed..  Plan of care was discussed with family. Questions answered.  Would continue to follow.

## 2018-12-22 NOTE — PROGRESS NOTE ADULT - PROBLEM SELECTOR PLAN 1
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 [employer]  2. N/A  [employer]/GROUP MEDICARE ADV [id number]  ID#: [id number]  Eff Date: 1/1/17 (calendar year policy)      Visit limit: based on medical necessity; follows Medicare guidelines     Auth required: Yes - after the 30th visit; auth specialist to submit auth online.      Copay: $25  Pays at 100%  OOP: $2400 - $1045.36 met  Secondary Insurance? NONE     Date of Onset:  Prior surgeries 2/18/17 and 3/14/17  ORIF left Olecranon fracture with 30 degree tricep advancement  Recent Date of Surgery: 6/19/17  Ulnar Nerve Transposition, submuscular  Diagnosis Precautions: Restricted use at this time; splint at NOC; off/on during the day for 2 more weeks (until July 17th)  Chart reviewed: Relevant co-morbidities, allergies, tests and medications: Plain film radiograph      SUBJECTIVE   Pt states she is able to move arm to greater degrees, able to touch forehead, nose, opposite shoulder with relative ease.  Current Pain: 1/10 incisional pain that is tender to touch  Functional Change: pt able to tolerate greater degrees of IR, able to get hand behind back but still not normal.    OBJECTIVE   Pt presents without any bracing or guarding of affected arm       Range of Motion (degrees)   Norm Left Right Left   Elbow         Date 6/23/17   Initial Initial 7/31/17   Flexion 140-150  80°  110  (bandaged) 152 100   Extension 0-10  70°  -27 0 -24   standard testing positions unless otherwise noted; Key: ranges are reported in active range of motion unless noted as AA=active assistive or P=passive range of motion, * denotes pain    Comments:      Range of Motion (degrees):     Left Right Left   Date  6/23/17 Initial  Initial  7/31/17   Index Finger         MCP Ext/flex         PIP Ext/Flex         DIP Ext/Flex         DPC distance 3.2 0 0.5   Middle Finger         MCP Ext/Flex         PIP Ext/Flex         DIP Ext/Flex         DPC distance 3.0 0 0.5   Ring Finger         MCP Ext/Flex         PIP Ext/Flex         DIP Ext/Flex         DPC distance 3.0 0 0   Small Finger         MCP Ext/Flex         PIP Ext/Flex         DIP Ext/Flex         DPC distance 1.5 0 0   Thumb         MCP Ext/Flex         IP Ext/Flex         Radial abduction         Palmar abduction         Tip Opposition To DIP jt   Mid-lateral SF tip    Opposition to      Base of 5th digit     0.4 cm   Key: Ext=extension, Flex=flexion, SF=small finger, MCP=metacarpophalangeal joint, PIP=proximal interphalangeal joint, DIP=distal interphalangeal joints; IP=interphalangeal joint; DPC =distal palmar crease, IP=interphalangeal joint ; standard testing positions unless otherwise noted; ranges are reported in active range of motion unless noted as AA=active assistive or P=passive range of motion; * denotes pain   Comments:  *denotes pain   7/31/17:  Pt reports weak adduction of the left fingers      Range of Motion (degrees) Norm Left Right   Shoulder                    Date  7/31/17 7/31/17   Flexion 170-180 130 145   Extension 50-60 45 50   Abduction 170-180 154 165   Adduction 50-75 42 50   Internal Rotation   54 70   External  Rotation 80-90 85 90   standard testing positions unless otherwise noted; Key: ranges are reported in active range of motion unless noted as AA=active assistive or P=passive range of motion, * denotes pain   Comments:      Range of Motion (degrees) Norm Left Right   Date 7/31/17  Initial Initial   Wrist Flexion 80 61 83   Wrist Extension 70 60 73   Radial Deviation 20 20 25   Ulnar Deviation 45 40 45   Forearm Supination 90 70 90   Forearm Pronation 90 85 85    standard testing positions unless otherwise noted; Key: ranges are reported in active range of motion unless noted as AA=active assistive or P=passive range of motion, * denotes pain   Comments:    Palpation:  Some tenderness to touch over scar     Wrist/Hand Circumferential: (cm)    Left Right Left   Date: 6/23/17 7/31/17   Elbow TBA TBA/24.3 on 7/31/17 25.8   Metacarpophalangeal Row 19 19 NT   Wrist at Styloids 17.3 15.7 15.5   Comment: Initial: Pt with notable hand/wrist/forearm swelling  Initial:  Unable to assess elbow due to bandages/bleeding  7/31/17 as above       Scapular Assessment Left Right   Resting Position adduction winging   Scapulohumeral Rhythm within functional limits within functional limits   Comments: Pt reports history of scoliosis and spinal fusion* denotes pain     Strength (out of 5) Left Right   Date: 7/31/17 Initial Initial   Elbow Flexion 4+/5 5/5   Elbow Extension 4/5 5/5   standard testing positions unless otherwise noted,* denotes pain  Comments: within motion limitation on the left        Strength: /Pinch   [] Gross muscle strength within functional/normal limits except as noted.  [] Only muscle strength that was assessed are noted.  [] Uninvolved muscle strength within functional/normal limits.  /Pinch (pounds of force) Left Right   Date 7/31`/17 Initial Initial             Trial 1/2/3 20,20,20 58,53,55        Average 20 55.5   Lateral Pinch          Trial 1/2/3 4,4,4 14,14,14        Average 4 14   3 Point Pinch          Trial 1/2/3 6,4,5 14,14,14        Average 5 14   Tip Pinch          Trial 1/2/3 4,4,4 10,10,10        Average 4 10   standard testing positions unless otherwise noted,* denotes pain  Comments:   Norms:  : Age: 60-69: male: left 56.5-96.6, right 69.3-111.7; female: left 35.6-49.2, right 45.0-59.3  Key/lateral pinch: Age: 65-69: male: left 22.0+/-3.6, right 23.4+/-3.9; female: left 14.3+/-2.8, right 15.0+/-2.6  Palmar/3 point pinch: Age: 65-69:  male: left 21.2+/-4.1, right 21.4+/-3.0; female: left 13.7+/-3.4, right 14.2+/-3.1  Tip pinch: Age: 65-69: male: left 15.4+/-2.9, right 17.0+/-4.2; female: left 10.5+/-2.4, right 10.6+/-2.0               Strength (out of 5) Left Right   Date 7/31/17 Initial Initial   Wrist Flexion 4/5 5/5   Writ Extension 4+/5 5/5   Wrist Radial Deviation 4+/5 5/5   Wrist Ulnar Deviation 4+/5 5/5   Forearm Supination 4/5 5/5   Forearm Pronation 4+/5 5/5   standard testing positions unless otherwise noted,* denotes pain  Comments:            Arverne Naina Monofilaments - Volar  Date: 6/23/17 Left Right    Thumb 2.83 NT    Index Finger 2.83 NT    Middle Finger  2.83 NT    Ring Finger 2.83 NT    Small Finger 2.83 NT   Norms: Normal: 1.65 - 2.83; Diminished Light Touch Sensation: 3.22 - 3.61; Diminished Protective Sensation: 3.84 - 4.31; Loss of Protective Sensation: 4.56; Deep Pressure Sensation: 6.65; Asensate: > 6.65       Outcome Measures: (Outcome Scoring)  Disabilities of the Arm, Shoulder and Hand (DASH):     (scored 0-100; a higher score indicates greater disability)     Treatment   Therapeutic Exercise:  P/ROM shoulder  AA/PROM elbow flexion/extension   Scapular AROM in all planes  PROM of elbow into flexion to tolerance, held for 30 seconds  AROM of shoulder into flexion and abduction in seated  AROM IR ER of shoulder in seated position.       Manual Treatment:   Sidelying scapular mobilizations  Joint mobilizations at the elbow: Grade 3 for flexion    Modalities:  Patient has been made aware of potential contraindications and possible risks associated with the use of the following modalities and has agreed.  Moist Heat (84856):  Location: left elbow, L shoulder; Position: sitting; Duration: 10 minutes Temperature: 160° F  Modality treatment resulted in decreased pain and improved range of motion.  Patient reports no adverse reaction to treatment.    Home Exercise Program:  Exercise: Date issued Date DC Comments   Use of  heat/ice 6/23/17       A/AA/ROM elbow; fingers, thumb 6/23/17       Towel stretch (IR) 7/27/17         Putty strengthening                      ASSESSMENT   Pt tolerated session well, able to tolerate greater degrees of elbow PROM this date with greater end-range motion. ROM of shoulder is improving, will initiate more progressive strengthening tasks next session to improve overall function of arm.    Pt would benefit from continued OT services in order to improve L UE range of motion and strength in order to promote participation in daily ADL/IADL tasks.    Pain after treatment: 1/10  Just sensitivity posterior elbow; nerve symptoms in the hand are a bit more \"charged up\" and some achiness at the elbow and shoulder.  Result of above outlined education: Verbalizes understanding and Needs reinforcement    Goals:  To be obtained by end of this plan of care:  1. Patient independent with modified and progressed home exercise program. (progressing as able)  2. Decrease involved left elbow to hand  pain to 1/10 pain, for resumption of self-cares with affected hand and eliminate need for pain medication use.  3. Pt will report at least 50% decrease in parasthesias in ulnar nerve innervated areas. (Pt reports minimal symptoms at this time)  4. Wound healing without infection/scar adherence.(MET)  5. Edema decreased to allow improved motion. (MET)  6. Achieve active fingertip to palm composite flexion for resumption of small object grasp and hold, ½ inch diameter tool use (eating utensil, toothbrush) and wring out wash cloth. (MOSTLY MET)  7. Achieve pinch / dexterity sufficient to  coins, button, tie shoes, pull zipper, type.  8. Achieve  strength of 25 pounds for resumption of light grocery bag lift,  steering wheel, perform light home/yard maintenance tasks.  9. Achieve pinch strength of 10 pounds for resumption of functional writing, cap/lid removal.   10. Patient will increase involved wrist/forearm  active range of motion to comparable to right, to position hand for functional tasks.(Improving)  11. Patient will increase involved elbow active range of motion to within 10 degrees of full extension, and within 125-130 degrees of flexion, to use extremity to feed self, perform self cares, and other regular daily functional tasks.  12. Patient will increase involved wrist/forearm/elbow strength to 4+/5 to 5/5 for ability to lift/carry and push/pull for household tasks, grocery shopping, home/yard tasks.  13. DASH: Patient will complete form to reflect an improved score from initial score of 79 to less than or equal to 15 (scored 0-100; a higher score indicates greater disability) to indicate pt reported improvement in function/disability/impairment (minimal detectable change: 15 points). (Score improved to 52 on 7/31/17)       PLAN   2 times per week for 12 weeks with tapering as the patient progresses  A/AA/PROM shoulder, elbow joint mobs into flexion, strengthening,  scar care, pt education    THERAPY DAILY BILLING   Primary Insurance: [employer]  Secondary Insurance: N/A    Evaluation Procedures:  No evaluation codes were used on this date of service    Timed Procedures:  Manual Therapy, 10 minutes  Therapeutic Exercise, 20 minutes    Untimed Procedures:  Hot/Cold Pack Therapy (10)    Total Treatment Time: 40 minutes    This insurance does not require G-codes    The referring provider's electronic or written signature on the evaluation authorizes the therapy plan of care and certifies the need for these services, furnished under this plan of care while under their care.  Physician Signature on file.      likely multifactorial  ch anemia  s/p lt his surgery  s/p prbc 12/21  iron profile 12/22/18 (partly affected from prbc 12/21)--low iron/transferrin saturation, ferritin is an acute phase reactant and could be artifactually elevated in this patient  will start iv iron as looking for a rapid response  monitor serial h/h--transfuse prbc as needed for symptomatic anemia or if hb is under 7  pt daughter refuses ct scan/radiology scan/bm bx/ any further work up

## 2018-12-22 NOTE — PHYSICAL THERAPY INITIAL EVALUATION ADULT - ADDITIONAL COMMENTS
Pt lives with her spouse in a house, no steps. Pt ambulates independently with SC and daughter assists with ADLs as needed. Information obtained from daughter at bedside. Pt is Estonian speaking and daughter also stating pt is confused post op

## 2018-12-22 NOTE — CONSULT NOTE ADULT - SUBJECTIVE AND OBJECTIVE BOX
CHIEF COMPLAINT:  83y Female with chief complaint of          HISTORY OF PRESENT ILLNESS:    SIMONE RIVERA is an 84 YO Female brought to ER because of left hip pain after a fall at home.  Patient was unable to get up from floor after a fall due to pain in left hip.  No LOC patient tripped and fall onto left side at home. No head injury, headache, neck or back pain.   , s/p IMN left hip fx. Awake but very confused, daughter at bedside .She had to be straight cath early this am 700 cc urine, since then has not voided , bladder scan shows  cc . Getting blood transfusion for severe anemia .     *************************************************************************************************  PAST MEDICAL & SURGICAL HISTORY:  No pertinent past medical history      MEDICATIONS  (STANDING):  ascorbic acid 500 milliGRAM(s) Oral two times a day  carbidopa/levodopa  25/100 1 Tablet(s) Oral three times a day  docusate sodium 100 milliGRAM(s) Oral three times a day  enoxaparin Injectable 40 milliGRAM(s) SubCutaneous every 24 hours  ferrous    sulfate 325 milliGRAM(s) Oral three times a day with meals  folic acid 1 milliGRAM(s) Oral daily  magnesium oxide 400 milliGRAM(s) Oral daily  multivitamin 1 Tablet(s) Oral daily  tamsulosin 0.4 milliGRAM(s) Oral at bedtime    MEDICATIONS  (PRN):  acetaminophen   Tablet .. 650 milliGRAM(s) Oral every 6 hours PRN Temp greater or equal to 38C (100.4F), Mild Pain (1 - 3)  diphenhydrAMINE 25 milliGRAM(s) Oral at bedtime PRN Insomnia  HYDROmorphone  Injectable 0.5 milliGRAM(s) IV Push every 6 hours PRN breakthrough pain  ondansetron Injectable 4 milliGRAM(s) IV Push every 6 hours PRN Nausea and/or Vomiting  oxyCODONE    IR 10 milliGRAM(s) Oral every 4 hours PRN Severe Pain (7 - 10)  oxyCODONE    IR 5 milliGRAM(s) Oral every 4 hours PRN Moderate Pain (4 - 6)      ALLERGIES:  No Known Allergies      SOCIAL HISTORY:          ETOH:                                  Smoking:                                   Drugs:                                         Occupation:    FAMILY HISTORY:  No pertinent family history in first degree relatives      CONSTITUTIONAL: No weakness, fevers or chills    EYES/ENT: No visual changes;  No vertigo or throat pain     NECK: No pain or stiffness    RESPIRATORY: No cough, wheezing, hemoptysis; No shortness of breath    CARDIOVASCULAR: No chest pain or palpitations    GASTROINTESTINAL: No abdominal or epigastric pain. No nausea, vomiting, or hematemesis; No diarrhea or constipation. No melena or hematochezia.    GENITOURINARY: hx urgery, incontinence hx uti's     NEUROLOGICAL: No numbness or weakness    SKIN: No itching, burning, rashes, or lesions     All other review of systems is negative unless indicated above.    ****************************************************************************************************  PHYSICAL EXAM:    Vital Signs Last 24 Hrs  T(C): 37.4 (22 Dec 2018 07:13), Max: 38.3 (22 Dec 2018 01:33)  T(F): 99.3 (22 Dec 2018 07:13), Max: 100.9 (22 Dec 2018 01:33)  HR: 81 (22 Dec 2018 07:13) (79 - 99)  BP: 104/60 (22 Dec 2018 09:46) (96/57 - 125/79)  BP(mean): --  RR: 17 (22 Dec 2018 07:13) (16 - 17)  SpO2: 97% (22 Dec 2018 07:13) (97% - 99%)    GENERAL: alert     ABDOMEN: soft  has ochoa  urine clear     BACK: no cva tenderness     LOWER EXTREMITIES:    NEUROLOGICAL:      *******************************************************************************************************  LABS:                        9.0    9.11  )-----------( 159      ( 22 Dec 2018 08:19 )             26.0     12-22    143  |  110<H>  |  43<H>  ----------------------------<  111<H>  4.2   |  27  |  1.00    Ca    7.6<L>      22 Dec 2018 08:19    TPro  4.7<L>  /  Alb  x   /  TBili  x   /  DBili  x   /  AST  x   /  ALT  x   /  AlkPhos  x   12-22        Urine Culture: 12-20 @ 12:59  Urine Culure Results   No growth to date.  Organism --      RADIOLOGY & ADDITIONAL STUDIES:    EXAM:  XR HIP 2-3V LT                            PROCEDURE DATE:  12/19/2018          INTERPRETATION:  DATE OF STUDY: 12/19/18.    COMPARISON: Had 12/19/19 pelvis film.    CLINICAL HISTORY:  Status post fall- with left hip pain.    Technique: 2 left hip films taken.    FINDINGS:  There is an acute, comminuted, intertrochanteric fracture of the proximal   left femur - avulsion fractures of the greater and lesser trochanters   also present.  There is varius deformity at the fracture site - with mild overlie of   proximal and distal fragments  No left hip dislocation.  The left acetabulum is maintained    IMPRESSION: Acute, proximal, left femoral fracture.                MARTINE GLEASON M.D., ATTENDING RADIOLOGIST  This document has been electronically signed. Dec 19 2018  7:03PM        *********************************************************************************************************  IMPRESSION: urinary retention    hx urge incontinence    PLAN: maintain ochoa until more mobile   patient will be going to rehab.

## 2018-12-22 NOTE — PROGRESS NOTE ADULT - SUBJECTIVE AND OBJECTIVE BOX
Patient is a 83y old  Female who presents with a chief complaint of Left hip fracture (22 Dec 2018 11:36)       Pt is seen and examined  pt is awake and lying in bed/out of bed to chair  pt seems comfortable and denies any complaints at this time    HPI:  SIMONE RIVERA is an 82 YO Female brought to ER because of left hip pain after a fall at home.  Patient was unable to get up from floor after a fall due to pain in left hip.  No LOC patient tripped and fall onto left side at home. No head injury, headache, neck or back pain. (19 Dec 2018 22:09)         ROS:  Negative except for:    MEDICATIONS  (STANDING):  ascorbic acid 500 milliGRAM(s) Oral two times a day  carbidopa/levodopa  25/100 1 Tablet(s) Oral three times a day  docusate sodium 100 milliGRAM(s) Oral three times a day  enoxaparin Injectable 40 milliGRAM(s) SubCutaneous every 24 hours  ferrous    sulfate 325 milliGRAM(s) Oral three times a day with meals  folic acid 1 milliGRAM(s) Oral daily  magnesium oxide 400 milliGRAM(s) Oral daily  multivitamin 1 Tablet(s) Oral daily  tamsulosin 0.4 milliGRAM(s) Oral at bedtime    MEDICATIONS  (PRN):  acetaminophen   Tablet .. 650 milliGRAM(s) Oral every 6 hours PRN Temp greater or equal to 38C (100.4F), Mild Pain (1 - 3)  diphenhydrAMINE 25 milliGRAM(s) Oral at bedtime PRN Insomnia  HYDROmorphone  Injectable 0.5 milliGRAM(s) IV Push every 6 hours PRN breakthrough pain  ondansetron Injectable 4 milliGRAM(s) IV Push every 6 hours PRN Nausea and/or Vomiting  oxyCODONE    IR 10 milliGRAM(s) Oral every 4 hours PRN Severe Pain (7 - 10)  oxyCODONE    IR 5 milliGRAM(s) Oral every 4 hours PRN Moderate Pain (4 - 6)      Allergies    No Known Allergies    Intolerances        Vital Signs Last 24 Hrs  T(C): 37.4 (22 Dec 2018 07:13), Max: 38.3 (22 Dec 2018 01:33)  T(F): 99.3 (22 Dec 2018 07:13), Max: 100.9 (22 Dec 2018 01:33)  HR: 81 (22 Dec 2018 07:13) (79 - 99)  BP: 104/60 (22 Dec 2018 09:46) (96/57 - 125/79)  BP(mean): --  RR: 17 (22 Dec 2018 07:13) (16 - 17)  SpO2: 97% (22 Dec 2018 07:13) (97% - 99%)    PHYSICAL EXAM  General: adult in NAD  HEENT: clear oropharynx, anicteric sclera, pink conjunctiva  Neck: supple  CV: normal S1/S2 with no murmur rubs or gallops  Lungs: positive air movement b/l ant lungs,clear to auscultation, no wheezes, no rales  Abdomen: soft non-tender non-distended, no hepatosplenomegaly  Ext: no clubbing cyanosis or edema  Skin: no rashes and no petechiae  Neuro: alert and oriented X 4, no focal deficits  LABS:                          9.0    9.11  )-----------( 159      ( 22 Dec 2018 08:19 )             26.0         Mean Cell Volume : 83.6 fl  Mean Cell Hemoglobin : 28.9 pg  Mean Cell Hemoglobin Concentration : 34.6 gm/dL  Auto Neutrophil # : 6.79 K/uL  Auto Lymphocyte # : 1.19 K/uL  Auto Monocyte # : 1.03 K/uL  Auto Eosinophil # : 0.04 K/uL  Auto Basophil # : 0.02 K/uL  Auto Neutrophil % : 74.6 %  Auto Lymphocyte % : 13.1 %  Auto Monocyte % : 11.3 %  Auto Eosinophil % : 0.4 %  Auto Basophil % : 0.2 %    Serial CBC's  12-22 @ 08:19  Hct-26.0 / Hgb-9.0 / Plat-159 / RBC-3.11 / WBC-9.11          Serial CBC's  12-21 @ 20:49  Hct-27.2 / Hgb-9.5 / Plat--- / RBC--- / WBC---          Serial CBC's  12-21 @ 09:21  Hct-20.6 / Hgb-7.0 / Plat-210 / RBC-2.39 / WBC-12.85          Serial CBC's  12-20 @ 17:26  Hct-27.1 / Hgb-9.0 / Plat-214 / RBC-3.16 / WBC-19.03          Serial CBC's  12-20 @ 09:28  Hct-25.3 / Hgb-8.3 / Plat--- / RBC--- / WBC---            12-22    143  |  110<H>  |  43<H>  ----------------------------<  111<H>  4.2   |  27  |  1.00    Ca    7.6<L>      22 Dec 2018 08:19    TPro  4.7<L>  /  Alb  x   /  TBili  x   /  DBili  x   /  AST  x   /  ALT  x   /  AlkPhos  x   12-22          Iron - Total Binding Capacity.: 212 ug/dL (12-22-18 @ 09:57)  Ferritin, Serum: 216 ng/mL (12-22-18 @ 09:56)  Vitamin B12, Serum: 541 pg/mL (12-22-18 @ 09:56)  Folate, Serum: >20.0 ng/mL (12-22-18 @ 09:56)  Reticulocyte Percent: 1.6 % (12-22-18 @ 08:19)                BLOOD SMEAR INTERPRETATION:       RADIOLOGY & ADDITIONAL STUDIES: Patient is a 83y old  Female who presents with a chief complaint of Left hip fracture (22 Dec 2018 11:36)       Pt is seen and examined  pt is awake and lying in bed, patient daughter is at bedside  pt seems comfortable and denies any complaints at this time    HPI:  SIMONE RIVERA is an 82 YO Female brought to ER because of left hip pain after a fall at home.  Patient was unable to get up from floor after a fall due to pain in left hip.  No LOC patient tripped and fall onto left side at home. No head injury, headache, neck or back pain. (19 Dec 2018 22:09)       MEDICATIONS  (STANDING):  ascorbic acid 500 milliGRAM(s) Oral two times a day  carbidopa/levodopa  25/100 1 Tablet(s) Oral three times a day  docusate sodium 100 milliGRAM(s) Oral three times a day  enoxaparin Injectable 40 milliGRAM(s) SubCutaneous every 24 hours  ferrous    sulfate 325 milliGRAM(s) Oral three times a day with meals  folic acid 1 milliGRAM(s) Oral daily  magnesium oxide 400 milliGRAM(s) Oral daily  multivitamin 1 Tablet(s) Oral daily  tamsulosin 0.4 milliGRAM(s) Oral at bedtime    MEDICATIONS  (PRN):  acetaminophen   Tablet .. 650 milliGRAM(s) Oral every 6 hours PRN Temp greater or equal to 38C (100.4F), Mild Pain (1 - 3)  diphenhydrAMINE 25 milliGRAM(s) Oral at bedtime PRN Insomnia  HYDROmorphone  Injectable 0.5 milliGRAM(s) IV Push every 6 hours PRN breakthrough pain  ondansetron Injectable 4 milliGRAM(s) IV Push every 6 hours PRN Nausea and/or Vomiting  oxyCODONE    IR 10 milliGRAM(s) Oral every 4 hours PRN Severe Pain (7 - 10)  oxyCODONE    IR 5 milliGRAM(s) Oral every 4 hours PRN Moderate Pain (4 - 6)      Allergies    No Known Allergies    Intolerances        Vital Signs Last 24 Hrs  T(C): 37.4 (22 Dec 2018 07:13), Max: 38.3 (22 Dec 2018 01:33)  T(F): 99.3 (22 Dec 2018 07:13), Max: 100.9 (22 Dec 2018 01:33)  HR: 81 (22 Dec 2018 07:13) (79 - 99)  BP: 104/60 (22 Dec 2018 09:46) (96/57 - 125/79)  BP(mean): --  RR: 17 (22 Dec 2018 07:13) (16 - 17)  SpO2: 97% (22 Dec 2018 07:13) (97% - 99%)    PHYSICAL EXAM  General: adult in NAD  HEENT: clear oropharynx, anicteric sclera, pink conjunctiva  Neck: supple  CV: normal S1/S2 with no murmur rubs or gallops  Lungs: positive air movement b/l ant lungs,clear to auscultation, no wheezes, no rales  Abdomen: soft non-tender non-distended, no hepatosplenomegaly  Ext: no clubbing cyanosis or edema  Skin: no rashes and no petechiae  Neuro: alert and oriented X 4, no focal deficits  LABS:                          9.0    9.11  )-----------( 159      ( 22 Dec 2018 08:19 )             26.0         Mean Cell Volume : 83.6 fl  Mean Cell Hemoglobin : 28.9 pg  Mean Cell Hemoglobin Concentration : 34.6 gm/dL  Auto Neutrophil # : 6.79 K/uL  Auto Lymphocyte # : 1.19 K/uL  Auto Monocyte # : 1.03 K/uL  Auto Eosinophil # : 0.04 K/uL  Auto Basophil # : 0.02 K/uL  Auto Neutrophil % : 74.6 %  Auto Lymphocyte % : 13.1 %  Auto Monocyte % : 11.3 %  Auto Eosinophil % : 0.4 %  Auto Basophil % : 0.2 %    Serial CBC's  12-22 @ 08:19  Hct-26.0 / Hgb-9.0 / Plat-159 / RBC-3.11 / WBC-9.11          Serial CBC's  12-21 @ 20:49  Hct-27.2 / Hgb-9.5 / Plat--- / RBC--- / WBC---          Serial CBC's  12-21 @ 09:21  Hct-20.6 / Hgb-7.0 / Plat-210 / RBC-2.39 / WBC-12.85          Serial CBC's  12-20 @ 17:26  Hct-27.1 / Hgb-9.0 / Plat-214 / RBC-3.16 / WBC-19.03          Serial CBC's  12-20 @ 09:28  Hct-25.3 / Hgb-8.3 / Plat--- / RBC--- / WBC---            12-22    143  |  110<H>  |  43<H>  ----------------------------<  111<H>  4.2   |  27  |  1.00    Ca    7.6<L>      22 Dec 2018 08:19    TPro  4.7<L>  /  Alb  x   /  TBili  x   /  DBili  x   /  AST  x   /  ALT  x   /  AlkPhos  x   12-22          Iron - Total Binding Capacity.: 212 ug/dL (12-22-18 @ 09:57)  Ferritin, Serum: 216 ng/mL (12-22-18 @ 09:56)  Vitamin B12, Serum: 541 pg/mL (12-22-18 @ 09:56)  Folate, Serum: >20.0 ng/mL (12-22-18 @ 09:56)  Reticulocyte Percent: 1.6 % (12-22-18 @ 08:19)    Iron with Total Binding Capacity in AM (12.22.18 @ 09:57)    % Saturation, Iron: 7 %    Iron - Total Binding Capacity.: 212 ug/dL    Iron Total, Serum: 14 ug/dL    Unsaturated Iron Binding Capacity: 198 ug/dL    Haptoglobin, Serum in AM (12.22.18 @ 09:57)    Haptoglobin, Serum: 176 mg/dL    Lactate Dehydrogenase, Serum in AM (12.22.18 @ 09:57)    Lactate Dehydrogenase, Serum: 227 U/L                BLOOD SMEAR INTERPRETATION: Normal WBC series and morphology, no apparent blast cells or immature wbc, no schistocytes or fragmented rbc, platelets are adequate, no clumping of platelets or giant platelets.

## 2018-12-22 NOTE — PROGRESS NOTE ADULT - SUBJECTIVE AND OBJECTIVE BOX
Pt seen and examined, daughter at bedside translating for pt. Pain well controlled. Denies CP/Dyspnea. Per nursing pt had one time fever overnight, pulled off distal dressing.     Vital Signs Last 24 Hrs  T(C): 37.4 (22 Dec 2018 07:13), Max: 38.3 (22 Dec 2018 01:33)  T(F): 99.3 (22 Dec 2018 07:13), Max: 100.9 (22 Dec 2018 01:33)  HR: 81 (22 Dec 2018 07:13) (79 - 106)  BP: 96/57 (22 Dec 2018 07:13) (89/54 - 125/79)  BP(mean): --  RR: 17 (22 Dec 2018 07:13) (16 - 17)  SpO2: 97% (22 Dec 2018 07:13) (97% - 100%)    Physical Exam  Gen: NAD  LLE:   proximal dressing c/d/i, distal dressing removed  hematoma palpable along distal bandage visualized, no skin changes  incision c/d/i without drainage  +ehl/fhl/ta/gs function  L2-S1 silt  Dp/pt pulse intact  No calf ttp  Compartments soft  +ochoa    A/P: 83y Female sp L Hip IM Nail POD 2  dressings changed today  Pain control  DVT ppx- Lovenox/SCDs  PT/WBAT/OOB  Ice/elevate  Medical management appreciated, will FU regarding fever  Incentive spirometry  Dispo planning

## 2018-12-22 NOTE — PROGRESS NOTE ADULT - ASSESSMENT
Dr Nestor Blanton  [phone], [facility]/[place],, case d/w dr blanton and as per dr blanton out patient hb was 10.6 in 12/2018  83/f was admitted after fall and had left hip fx, underwent lt hip surgery 12/20/18. Hematology was consulted for anemia, patient received 2 units prbc on 12/21/18  patient is with hx of anemia as out patient, as per daughter ray and as per dr blanton--patient never had hematology evaluation  patient with some weight loss about 9 pounds in last 3 months--as i d/w daughter and dr blanton. per dr blanton patient/family had refused work up for weight loss with him. Daughter thinks weight loss as she is not eating well lately.  i d/w daughter ray on 12/21 at consult as well as on 12/22/2018--advised further work up including ct scan c/a/p , gi evaluation and gi work up--pt daughter ray refuses again any further work up for weight loss. i expressed my concern for malignancy, considering her age and weight loss. i stressed for further work up. family/daughter refuses any further work up for weight loss with clear understanding potential serious but treatable cancer/pathology/malignancy may remain undiagnosed/untreated and she may die/become disabled from undiagnosed/untreated malignancy/gi pathology/cancer  patient daughter also refuses bm asp and bx to definitively exclude hematology pathology/mds.

## 2018-12-22 NOTE — PROVIDER CONTACT NOTE (OTHER) - ASSESSMENT
pt alert but confused speaking Irish and english. vss. except temp 100.9 rectal tylenol given as ordered

## 2018-12-22 NOTE — PROGRESS NOTE ADULT - SUBJECTIVE AND OBJECTIVE BOX
ID Progress note     Name: SIMONE RIVERA  Age: 83y  Gender: Female  MRN: [id number]    Interval History-- Events noted, had ochoa placed secondary to urinary retention . Remains confused post op, daughter concerned about not able to feed her self . Low grade fevers   Notes reviewed    Past Medical History--  No pertinent past medical history      For details regarding the patient's social history, family history, and other miscellaneous elements, please refer the initial infectious diseases consultation and/or the admitting history and physical examination for this admission. Patient resting comfortably     Allergies--  Allergies    No Known Allergies    Intolerances        Medications--  Antibiotics:    Immunologic:    Other:  acetaminophen   Tablet .. PRN  ascorbic acid  carbidopa/levodopa  25/100  diphenhydrAMINE PRN  docusate sodium  enoxaparin Injectable  ferrous    sulfate  folic acid  HYDROmorphone  Injectable PRN  magnesium oxide  multivitamin  ondansetron Injectable PRN  oxyCODONE    IR PRN  oxyCODONE    IR PRN  tamsulosin      Review of Systems--  Review of systems unable to be obtained secondary to clinical condition.    Physical Examination--    Vital Signs: T(F): 99.3 (12-22-18 @ 07:13), Max: 100.9 (12-22-18 @ 01:33)  HR: 81 (12-22-18 @ 07:13)  BP: 104/60 (12-22-18 @ 09:46)  RR: 17 (12-22-18 @ 07:13)  SpO2: 97% (12-22-18 @ 07:13)  Wt(kg): --  General: Nontoxic-appearing Female in no acute distress.  HEENT: AT/NC. PERRL. EOMI. Anicteric. Conjunctiva pink and moist. Oropharynx clear. Dentition fair.  Neck: Not rigid. No sense of mass.  Nodes: None palpable.  Lungs: Clear bilaterally without rales, wheezing or rhonchi  Heart: Regular rate and rhythm. No Murmur. No rub. No gallop. No palpable thrill.  Abdomen: Bowel sounds present and normoactive. Soft. Nondistended. Nontender. No sense of mass. No organomegaly.  Back: No spinal tenderness. No costovertebral angle tenderness.   Extremities: No cyanosis or clubbing. No edema. dressing in place   Skin: Warm. Dry. Good turgor. No rash. No vasculitic stigmata.  Psychiatric: Appropriate affect and mood for situation.         Laboratory Studies--  CBC                        9.0    9.11  )-----------( 159      ( 22 Dec 2018 08:19 )             26.0       Chemistries  12-22    143  |  110<H>  |  43<H>  ----------------------------<  111<H>  4.2   |  27  |  1.00        Ca    7.6<L>      22 Dec 2018 08:19    TPro  4.7<L>  /  Alb  x   /  TBili  x   /  DBili  x   /  AST  x   /  ALT  x   /  AlkPhos  x   12-22    Urinalysis + Microscopic Examination (12.22.18 @ 09:58)    Glucose Qualitative, Urine: Negative    Blood, Urine: Moderate    Urine Appearance: Clear    Bilirubin: Negative    Color: Yellow    Urobilinogen: Negative    Specific Gravity: 1.020    Protein, Urine: 25 mg/dL    pH Urine: 5.0    Leukocyte Esterase Concentration: Small    Nitrite: Negative    Ketone - Urine: Negative    Red Blood Cell - Urine: 3-5 /HPF    White Blood Cell - Urine: 11-25    Epithelial Cells: Occasional    Bacteria: Few  Culture Data    Culture - Blood (collected 20 Dec 2018 12:59)  Source: .Blood Blood  Preliminary Report (21 Dec 2018 13:01):    No growth to date.    Culture - Blood (collected 20 Dec 2018 12:59)  Source: .Blood Blood  Preliminary Report (21 Dec 2018 13:01):    No growth to date.        Radiology:  Xray Ankle Complete 3 Views, Left (12.19.18 @ 20:02)   FINDINGS:  No fracture or dislocation.  Ankle mortise and talar dome are maintained.  Venous phleboliths noted.    IMPRESSION: No acute fracture-subluxation demonstrated.     Xray Knee 3 Views, Left (12.19.18 @ 20:01) >    Impression:   No fracture or dislocation seen.   Other changes as above.     Xray Hip 2-3 Views, Left (12.19.18 @ 18:55) >  FINDINGS:  There is an acute, comminuted, intertrochanteric fracture of the proximal   left femur - avulsion fractures of the greater and lesser trochanters   also present.  There is varius deformity at the fracture site - with mild overlie of   proximal and distal fragments  No left hip dislocation.  The left acetabulum is maintained    IMPRESSION: Acute, proximal, left femoral fracture.    Xray Chest 1 View AP/PA (12.19.18 @ 18:54) >  FINDINGS:   Thoracic aortic atheromatous changes and ectasia.  Mild cardiomegaly.  No acute congestive changes.  No bilateral focal infiltrates. No pleural effusion or pneumothorax.  Degenerative changes of thoracic spine and bilateral shoulders.    IMPRESSION:   Negative for acute cardiopulmonary process.        Assessment :   83 year old female with no significant medical hx admitted with fall from home and noted ot have left hip fracture . She had low grade fever likely from fracture site hematoma . She is to undergo IM nailing left hip fracture .    Doubt any infectious etiology as her CXR and UA are negative     Post op anemia related to fx surgery .    She is also confused post op ? post anaesthetic effect ? TIA    Plan :   - Follow urine cs sent   - observe off systemic antibiotics   - surgical prophylaxis   - trend CBC    Continue with present regime .  Appropriate use of antibiotics and adverse effects reviewed.    I have discussed the above plan of care with patient's family in detail. They expressed understanding of the treatment plan . Risks, benefits and alternatives discussed in detail. I have asked if they have any questions or concerns and appropriately addressed them to the best of my ability .      > 35 minutes spent in direct patient care reviewing  the notes, lab data/ imaging , discussion with multidisciplinary team. All questions were addressed and answered to the best of my capacity .    Thank you for allowing me to participate in the care of your patient .        Harry Huerta MD  [phone]

## 2018-12-23 DIAGNOSIS — R33.9 RETENTION OF URINE, UNSPECIFIED: ICD-10-CM

## 2018-12-23 DIAGNOSIS — R50.9 FEVER, UNSPECIFIED: ICD-10-CM

## 2018-12-23 LAB
ANION GAP SERPL CALC-SCNC: 6 MMOL/L — SIGNIFICANT CHANGE UP (ref 5–17)
BUN SERPL-MCNC: 35 MG/DL — HIGH (ref 7–23)
CALCIUM SERPL-MCNC: 7.8 MG/DL — LOW (ref 8.5–10.1)
CHLORIDE SERPL-SCNC: 109 MMOL/L — HIGH (ref 96–108)
CO2 SERPL-SCNC: 28 MMOL/L — SIGNIFICANT CHANGE UP (ref 22–31)
CREAT SERPL-MCNC: 0.89 MG/DL — SIGNIFICANT CHANGE UP (ref 0.5–1.3)
GLUCOSE SERPL-MCNC: 106 MG/DL — HIGH (ref 70–99)
HCT VFR BLD CALC: 27.2 % — LOW (ref 34.5–45)
HGB BLD-MCNC: 9.2 G/DL — LOW (ref 11.5–15.5)
MCHC RBC-ENTMCNC: 28.8 PG — SIGNIFICANT CHANGE UP (ref 27–34)
MCHC RBC-ENTMCNC: 33.8 GM/DL — SIGNIFICANT CHANGE UP (ref 32–36)
MCV RBC AUTO: 85.3 FL — SIGNIFICANT CHANGE UP (ref 80–100)
NRBC # BLD: 0 /100 WBCS — SIGNIFICANT CHANGE UP (ref 0–0)
PLATELET # BLD AUTO: 196 K/UL — SIGNIFICANT CHANGE UP (ref 150–400)
POTASSIUM SERPL-MCNC: 4.1 MMOL/L — SIGNIFICANT CHANGE UP (ref 3.5–5.3)
POTASSIUM SERPL-SCNC: 4.1 MMOL/L — SIGNIFICANT CHANGE UP (ref 3.5–5.3)
RBC # BLD: 3.19 M/UL — LOW (ref 3.8–5.2)
RBC # FLD: 13.3 % — SIGNIFICANT CHANGE UP (ref 10.3–14.5)
SODIUM SERPL-SCNC: 143 MMOL/L — SIGNIFICANT CHANGE UP (ref 135–145)
WBC # BLD: 8.89 K/UL — SIGNIFICANT CHANGE UP (ref 3.8–10.5)
WBC # FLD AUTO: 8.89 K/UL — SIGNIFICANT CHANGE UP (ref 3.8–10.5)

## 2018-12-23 PROCEDURE — 74018 RADEX ABDOMEN 1 VIEW: CPT | Mod: 26

## 2018-12-23 PROCEDURE — 71045 X-RAY EXAM CHEST 1 VIEW: CPT | Mod: 26

## 2018-12-23 RX ORDER — POLYETHYLENE GLYCOL 3350 17 G/17G
17 POWDER, FOR SOLUTION ORAL DAILY
Qty: 0 | Refills: 0 | Status: DISCONTINUED | OUTPATIENT
Start: 2018-12-23 | End: 2018-12-26

## 2018-12-23 RX ORDER — MAGNESIUM HYDROXIDE 400 MG/1
30 TABLET, CHEWABLE ORAL DAILY
Qty: 0 | Refills: 0 | Status: DISCONTINUED | OUTPATIENT
Start: 2018-12-23 | End: 2018-12-26

## 2018-12-23 RX ORDER — SENNA PLUS 8.6 MG/1
2 TABLET ORAL AT BEDTIME
Qty: 0 | Refills: 0 | Status: DISCONTINUED | OUTPATIENT
Start: 2018-12-23 | End: 2018-12-25

## 2018-12-23 RX ADMIN — Medication 1 TABLET(S): at 12:45

## 2018-12-23 RX ADMIN — TAMSULOSIN HYDROCHLORIDE 0.4 MILLIGRAM(S): 0.4 CAPSULE ORAL at 21:42

## 2018-12-23 RX ADMIN — Medication 325 MILLIGRAM(S): at 17:46

## 2018-12-23 RX ADMIN — Medication 650 MILLIGRAM(S): at 09:16

## 2018-12-23 RX ADMIN — Medication 500 MILLIGRAM(S): at 17:46

## 2018-12-23 RX ADMIN — Medication 325 MILLIGRAM(S): at 12:45

## 2018-12-23 RX ADMIN — ENOXAPARIN SODIUM 40 MILLIGRAM(S): 100 INJECTION SUBCUTANEOUS at 08:52

## 2018-12-23 RX ADMIN — Medication 650 MILLIGRAM(S): at 09:11

## 2018-12-23 RX ADMIN — Medication 1 MILLIGRAM(S): at 12:45

## 2018-12-23 RX ADMIN — Medication 100 MILLIGRAM(S): at 14:53

## 2018-12-23 RX ADMIN — CARBIDOPA AND LEVODOPA 1 TABLET(S): 25; 100 TABLET ORAL at 21:42

## 2018-12-23 RX ADMIN — IRON SUCROSE 100 MILLIGRAM(S): 20 INJECTION, SOLUTION INTRAVENOUS at 12:45

## 2018-12-23 RX ADMIN — CARBIDOPA AND LEVODOPA 1 TABLET(S): 25; 100 TABLET ORAL at 14:53

## 2018-12-23 RX ADMIN — MAGNESIUM OXIDE 400 MG ORAL TABLET 400 MILLIGRAM(S): 241.3 TABLET ORAL at 12:45

## 2018-12-23 RX ADMIN — CARBIDOPA AND LEVODOPA 1 TABLET(S): 25; 100 TABLET ORAL at 05:11

## 2018-12-23 NOTE — PROGRESS NOTE ADULT - PROBLEM SELECTOR PLAN 1
likely multifactorial  had an anemia as out patient as per pt family and pt pcp  s/p lt hip surgery  s/p prbc 12/21  iron profile 12/22/18 (partly affected from prbc 12/21)--low iron/transferrin saturation, ferritin is an acute phase reactant and could be artifactually elevated in this patient  patient is on iv iron as looking for a rapid response, day 2 (2/3) today  monitor serial h/h--transfuse prbc as needed for symptomatic anemia or if hb is under 7  pt daughter refuses ct scan/radiology scan/bm bx/ any further work up. likely multifactorial  had an anemia as out patient as per pt family and pt pcp, did not had a work up per pt/family/pcp  s/p lt hip surgery  s/p prbc 12/21  iron profile 12/22/18 (partly affected from prbc 12/21)--low iron/transferrin saturation, ferritin is an acute phase reactant and could be artifactually elevated in this patient  patient is on iv iron as looking for a rapid response, day 2 (2/3) today  hb at 9.2--low but stable  monitor serial h/h--transfuse prbc as needed for symptomatic anemia or if hb is under 7  pt daughter refuses ct scan/radiology scan/bm bx/ any further work up.  I d/w estelita montejo at bedside 12/23--who is in agreement of above and refuses any extensive work up(for anemia and skight weight loss), aware concern is a malignancy and with family refusal patient may die/become disabled from undiagnosed/untreated pathology/cancer

## 2018-12-23 NOTE — PROVIDER CONTACT NOTE (OTHER) - ACTION/TREATMENT ORDERED:
No need for rectal temp, recheck temp at next vitals and if above 100.4 orally-then give po tylenol as ordered as pt had fever work up done already.
ua c&S if not sent yet
16F ochoa inserted. 350cc initially returned.  cont to monitor

## 2018-12-23 NOTE — PROGRESS NOTE ADULT - ASSESSMENT
Dr Nestor Blanton  [phone], [facility]/[place],, case d/w dr blanton and as per dr blanton out patient hb was 10.6 in 12/2018  83/f was admitted after fall and had left hip fx, underwent lt hip surgery 12/20/18. Hematology was consulted for anemia, patient received 2 units prbc on 12/21/18  patient is with hx of anemia as out patient, as per daughter ray and as per dr blanton--patient never had hematology evaluation  patient with some weight loss about 9 pounds in last 3 months--as i d/w daughter and dr blanton. per dr blanton patient/family had refused work up for weight loss with him. Daughter thinks weight loss as she is not eating well lately.  i d/w daughter ray on 12/21 at consult as well as on 12/22/2018--advised further work up including ct scan c/a/p , gi evaluation and gi work up--pt daughter ray refuses again any further work up for weight loss. i expressed my concern for malignancy, considering her age and weight loss. i stressed for further work up. family/daughter refuses any further work up for weight loss with clear understanding potential serious but treatable cancer/pathology/malignancy may remain undiagnosed/untreated and she may die/become disabled from undiagnosed/untreated malignancy/gi pathology/cancer  patient daughter also refuses bm asp and bx to definitively exclude hematology pathology/mds/leukemia/any other pathology Dr Nestor Blanton  [phone], [facility]/[place],, case d/w dr blanton and as per dr blanton out patient hb was 10.6 in 12/2018  83/f was admitted after fall and had left hip fx, underwent lt hip surgery 12/20/18. Hematology was consulted for anemia, patient received 2 units prbc on 12/21/18  patient is with hx of anemia as out patient, as per daughter ray and as per dr blanton--patient never had hematology evaluation  patient with some weight loss about 9 pounds in last 3 months--as i d/w daughter and dr blanton. per dr blanton patient/family had refused work up for weight loss with him. Daughter thinks weight loss as she is not eating well lately.  i d/w daughter ray on 12/21 at consult as well as on 12/22/2018--advised further work up including ct scan c/a/p , gi evaluation and gi work up--pt daughter ray refuses again any further work up for weight loss. i expressed my concern for malignancy, considering her age and weight loss. i stressed for further work up. family/daughter refuses any further work up for weight loss with clear understanding potential serious but treatable cancer/pathology/malignancy may remain undiagnosed/untreated and she may die/become disabled from undiagnosed/untreated malignancy/gi pathology/cancer  patient daughter also refuses bm asp and bx to definitively exclude hematology pathology/mds/leukemia/any other pathology  12/23/18--neurology/pcp are following, had ct head and for intracranial mass--pt son prefer further work up and plan as per and with neurology, refuses ct body

## 2018-12-23 NOTE — PROGRESS NOTE ADULT - SUBJECTIVE AND OBJECTIVE BOX
ID Progress note     Name: SIMONE RIVERA  Age: 83y  Gender: Female  MRN: [id number]    Interval History-- Events noted , low grade  fever, still very lethargic and confused as per family, was hallucinating tis am. Results of ct head noted, has a known hx of intracerebral mass ? meningioma . Appetitie is poor   Notes reviewed    Past Medical History--  No pertinent past medical history      For details regarding the patient's social history, family history, and other miscellaneous elements, please refer the initial infectious diseases consultation and/or the admitting history and physical examination for this admission.    Allergies--  Allergies    No Known Allergies    Intolerances        Medications--  Antibiotics:    Immunologic:    Other:  acetaminophen   Tablet .. PRN  ascorbic acid  carbidopa/levodopa  25/100  diphenhydrAMINE PRN  docusate sodium  enoxaparin Injectable  ferrous    sulfate  folic acid  HYDROmorphone  Injectable PRN  iron sucrose Injectable  magnesium oxide  multivitamin  ondansetron Injectable PRN  oxyCODONE    IR PRN  oxyCODONE    IR PRN  tamsulosin      Review of Systems--  Review of systems unable to be obtained secondary to clinical condition.    Physical Examination--    Vital Signs: T(F): 100.6 (12-23-18 @ 08:12), Max: 100.6 (12-23-18 @ 08:12)  HR: 83 (12-23-18 @ 08:12)  BP: 115/59 (12-23-18 @ 08:12)  RR: 16 (12-23-18 @ 08:12)  SpO2: 96% (12-23-18 @ 08:12)  Wt(kg): --  General: Nontoxic-appearing Female in no acute distress lethargic but arouse able   HEENT: AT/NC. PERRL. EOMI. Anicteric. Conjunctiva pink and moist. Oropharynx clear. Dentition fair.  Neck: Not rigid. No sense of mass.  Nodes: None palpable.  Lungs: Clear bilaterally without rales, wheezing or rhonchi  Heart: Regular rate and rhythm. No Murmur. No rub. No gallop. No palpable thrill.  Abdomen: Bowel sounds present and normoactive. Soft. Nondistended. Nontender. No sense of mass. No organomegaly.  Back: No spinal tenderness. No costovertebral angle tenderness.   Extremities: No cyanosis or clubbing. No edema.   Skin: Warm. Dry. Good turgor. No rash. No vasculitic stigmata.  Psychiatric: Appropriate affect and mood for situation.         Laboratory Studies--  CBC                        9.2    8.89  )-----------( 196      ( 23 Dec 2018 06:16 )             27.2       Chemistries  12-23    143  |  109<H>  |  35<H>  ----------------------------<  106<H>  4.1   |  28  |  0.89    Ca    7.8<L>      23 Dec 2018 06:16    TPro  4.7<L>  /  Alb  x   /  TBili  x   /  DBili  x   /  AST  x   /  ALT  x   /  AlkPhos  x   12-22      Culture Data    Culture - Urine (collected 21 Dec 2018 22:48)  Source: .Urine Catheterized  Final Report (22 Dec 2018 22:45):    No growth    Culture - Blood (collected 20 Dec 2018 12:59)  Source: .Blood Blood  Preliminary Report (21 Dec 2018 13:01):    No growth to date.    Culture - Blood (collected 20 Dec 2018 12:59)  Source: .Blood Blood  Preliminary Report (21 Dec 2018 13:01):    No growth to date.    Radiology:   CT Head No Cont (12.22.18 @ 15:45) >  FINDINGS: There is a 1.4 x 1.1 x 1.3 cm (AP x TRV x CC) hyperdense   appearing left-sided intraventricular mass which is inseparable from the   left choroid plexus in the lateral ventricular body with extension to the   ventricular trigone. There is no associated mass effect or shift of   midline structures. There is no hydrocephalus.    There is no acute intracranial hemorrhage or abnormal extra axial fluid   collection.    There is diffuse cerebral volume loss with prominence of the sulci,   fissures, and cisternal spaces which is normal for the patient's age.   There is minimal periventricular white matter hypoattenuation   statistically compatible with microvascular changes given calcific   atherosclerotic disease of the intracranial arteries.    The paranasal sinuses and mastoid air cells are clear. The calvarium   appears intact. The orbits appear unremarkable.    IMPRESSION: No acute intracranial hemorrhage.    1.4 cm hyperdense appearing left-sided intraventricular mass inseparable   from the choroid plexus, as discussed. Recommend further evaluation with   a contrast-enhanced MRI examination, if there are no clinical   contraindications.        Assessment--  83 year old female with no significant medical hx admitted with fall from home and noted ot have left hip fracture . She had low grade fever likely from fracture site hematoma . She is to undergo IM nailing left hip fracture .    Doubt any infectious etiology as her CXR and UA are negative  Her post op fever may be related to alelectasis    Post op anemia related to fx surgery .    She is also confused post op ? post anaesthetic effect . 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    Plan :   - Repeat CXR in am   - observe off antibiotics     - trend CBC  - PT evaluation     Continue with present regime .  Appropriate use of antibiotics and adverse effects reviewed.    I have discussed the above plan of care with patient 's family in detail. They expressed understanding of the treatment plan . Risks, benefits and alternatives discussed in detail. I have asked if they have any questions or concerns and appropriately addressed them to the best of my ability .      > 35 minutes spent in direct patient care reviewing  the notes, lab data/ imaging , discussion with multidisciplinary team. All questions were addressed and answered to the best of my capacity .    Thank you for allowing me to participate in the care of your patient .        Harry Huerta MD  [phone]

## 2018-12-23 NOTE — PROGRESS NOTE ADULT - SUBJECTIVE AND OBJECTIVE BOX
Patient seen and examined. Pain controlled. No acute events overnight per nursing. Pt demented at baseline, limited ability to understand and follow commands for exam. OVerall resting comfortably in bed, pleasant.     MEDICATIONS  (STANDING):  ascorbic acid 500 milliGRAM(s) Oral two times a day  carbidopa/levodopa  25/100 1 Tablet(s) Oral three times a day  docusate sodium 100 milliGRAM(s) Oral three times a day  enoxaparin Injectable 40 milliGRAM(s) SubCutaneous every 24 hours  ferrous    sulfate 325 milliGRAM(s) Oral three times a day with meals  folic acid 1 milliGRAM(s) Oral daily  iron sucrose Injectable 100 milliGRAM(s) IV Push every 24 hours  magnesium oxide 400 milliGRAM(s) Oral daily  multivitamin 1 Tablet(s) Oral daily  tamsulosin 0.4 milliGRAM(s) Oral at bedtime    Allergies    No Known Allergies    Intolerances                            9.2    8.89  )-----------( 196      ( 23 Dec 2018 06:16 )             27.2     23 Dec 2018 06:16    143    |  109    |  35     ----------------------------<  106    4.1     |  28     |  0.89     Ca    7.8        23 Dec 2018 06:16    TPro  4.7    /  Alb  x      /  TBili  x      /  DBili  x      /  AST  x      /  ALT  x      /  AlkPhos  x      22 Dec 2018 09:56      Vital Signs Last 24 Hrs  T(C): 37.9 (12-22-18 @ 23:48), Max: 37.9 (12-22-18 @ 23:48)  T(F): 100.2 (12-22-18 @ 23:48), Max: 100.2 (12-22-18 @ 23:48)  HR: 88 (12-22-18 @ 23:48) (88 - 88)  BP: 135/65 (12-22-18 @ 23:48) (104/60 - 135/65)  RR: 16 (12-22-18 @ 23:48) (16 - 16)  SpO2: 100% (12-22-18 @ 23:48) (98% - 100%)    Physical Exam  Gen: NAD  RLE:   Dressing c/d/i  +ehl/fhl/ta/gs function  unable to assess sensation due to patient mental status  Dp/pt pulse intact  No calf ttp  Compartments soft    A/P: 83y Female sp R Hip IM Nail POD 3  Pain control  dressing changes PRN  DVT ppx- Continue xarelto   PT/WBAT/OOB  FU labs  Ice/elevate  Medical management appreciated  Incentive spirometry  no further orthopedic sx intervention at this time  Please FU with Dr Madera in the office in 1-2 weeks  Ortho Stable for DC

## 2018-12-23 NOTE — PROGRESS NOTE ADULT - SUBJECTIVE AND OBJECTIVE BOX
Neurology Follow up note    SIMONE [id number]    HPI:  SIMONE RIVERA is an 82 YO Female brought to ER because of left hip pain after a fall at home.  Patient was unable to get up from floor after a fall due to pain in left hip.  No LOC patient tripped and fall onto left side at home. No head injury, headache, neck or back pain. (19 Dec 2018 22:09)      Interval History - reported confusion/lethargy    Patient is seen, chart was reviewed and case was discussed with the treatment team.  Pt is not in any distress.   Lying on bed comfortably.   No events reported overnight.   No clinical seizure was reported.  Sitting on chair bed comfortably.    is at bedside.    Vital Signs Last 24 Hrs  T(C): 37.4 (22 Dec 2018 07:13), Max: 38.3 (22 Dec 2018 01:33)  T(F): 99.3 (22 Dec 2018 07:13), Max: 100.9 (22 Dec 2018 01:33)  HR: 81 (22 Dec 2018 07:13) (79 - 95)  BP: 104/60 (22 Dec 2018 09:46) (96/57 - 125/79)  BP(mean): --  RR: 17 (22 Dec 2018 07:13) (16 - 17)  SpO2: 97% (22 Dec 2018 07:13) (97% - 99%)        REVIEW OF SYSTEMS:    Constitutional: No fever, weight loss or fatigue  Eyes: No eye pain, visual disturbances, or discharge  ENT:  No difficulty hearing, tinnitus, vertigo; No sinus or throat pain  Neck: No pain or stiffness  Respiratory: No cough, wheezing, chills or hemoptysis  Cardiovascular: No chest pain, palpitations, shortness of breath, dizziness or leg swelling  Gastrointestinal: No abdominal or epigastric pain. No nausea, vomiting or hematemesis; No diarrhea or constipation. No melena or hematochezia.  Genitourinary: No dysuria, frequency, hematuria or incontinence  Neurological: No headaches,   Psychiatric: No depression, anxiety, mood swings or difficulty sleeping  Skin: No itching, burning, rashes or lesions   Lymph Nodes: No enlarged glands  Endocrine: No heat or cold intolerance; No hair loss, No h/o diabetes or thyroid dysfunction  Allergy and Immunologic: No hives or eczema    On Neurological Examination:    Mental Status - Pt is alert, awake, oriented X1 . Follows commands well and able to answer questions appropriately  Mood and affect  normal    Speech -  dysarthric,    Cranial Nerves - Pupils 3 mm equal and reactive to light, extraocular eye movements intact. Pt has no visual field deficit.  Pt has  right facial asymmetry. Facial sensation is intact.Tongue - is in midline.    Muscle tone - is normal all over. Moves all extremities equally. No asymmetry is seen.      Motor Exam - no pronator drift.    Sensory Exam -  Pt withdraws all extremities equally on stimulation. No asymmetry seen. No complaints of tingling, numbness.        Finger to nose: normal/      Deep tendon Reflexes - 2 plus all over.         Neck Supple -  Yes.     MEDICATIONS    acetaminophen   Tablet .. 650 milliGRAM(s) Oral every 6 hours PRN  ascorbic acid 500 milliGRAM(s) Oral two times a day  carbidopa/levodopa  25/100 1 Tablet(s) Oral three times a day  diphenhydrAMINE 25 milliGRAM(s) Oral at bedtime PRN  docusate sodium 100 milliGRAM(s) Oral three times a day  enoxaparin Injectable 40 milliGRAM(s) SubCutaneous every 24 hours  ferrous    sulfate 325 milliGRAM(s) Oral three times a day with meals  folic acid 1 milliGRAM(s) Oral daily  HYDROmorphone  Injectable 0.5 milliGRAM(s) IV Push every 6 hours PRN  iron sucrose Injectable 100 milliGRAM(s) IV Push every 24 hours  magnesium oxide 400 milliGRAM(s) Oral daily  multivitamin 1 Tablet(s) Oral daily  ondansetron Injectable 4 milliGRAM(s) IV Push every 6 hours PRN  oxyCODONE    IR 10 milliGRAM(s) Oral every 4 hours PRN  oxyCODONE    IR 5 milliGRAM(s) Oral every 4 hours PRN  tamsulosin 0.4 milliGRAM(s) Oral at bedtime      Allergies    No Known Allergies    Intolerances        LABS:  CBC Full  -  ( 22 Dec 2018 08:19 )  WBC Count : 9.11 K/uL  Hemoglobin : 9.0 g/dL  Hematocrit : 26.0 %  Platelet Count - Automated : 159 K/uL  Mean Cell Volume : 83.6 fl  Mean Cell Hemoglobin : 28.9 pg  Mean Cell Hemoglobin Concentration : 34.6 gm/dL  Auto Neutrophil # : 6.79 K/uL  Auto Lymphocyte # : 1.19 K/uL  Auto Monocyte # : 1.03 K/uL  Auto Eosinophil # : 0.04 K/uL  Auto Basophil # : 0.02 K/uL  Auto Neutrophil % : 74.6 %  Auto Lymphocyte % : 13.1 %  Auto Monocyte % : 11.3 %  Auto Eosinophil % : 0.4 %  Auto Basophil % : 0.2 %    Urinalysis Basic - ( 22 Dec 2018 09:58 )    Color: Yellow / Appearance: Clear / S.020 / pH: x  Gluc: x / Ketone: Negative  / Bili: Negative / Urobili: Negative   Blood: x / Protein: 25 mg/dL / Nitrite: Negative   Leuk Esterase: Small / RBC: 3-5 /HPF / WBC -   Sq Epi: x / Non Sq Epi: Occasional / Bacteria: Few          143  |  110<H>  |  43<H>  ----------------------------<  111<H>  4.2   |  27  |  1.00    Ca    7.6<L>      22 Dec 2018 08:19    TPro  4.7<L>  /  Alb  x   /  TBili  x   /  DBili  x   /  AST  x   /  ALT  x   /  AlkPhos  x       Hemoglobin A1C:     Vitamin B12 Vitamin B12, Serum: 541 pg/mL ( @ 09:56)      RADIOLOGY    ASSESSMENT AND PLAN:      sp mechanical fall.  sp left hemiarthroplasty   ams/facial asymmetry,likely fever .  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    sepsis work up.  Physical therapy evaluation.  OOB to chair/ambulation with assistance only.  Advanced care planning was discussed with family.  Pain is accessed and addressed..  Plan of care was discussed with family. Questions answered.  Would continue to follow.

## 2018-12-23 NOTE — PROGRESS NOTE ADULT - SUBJECTIVE AND OBJECTIVE BOX
Patient is a 83y old  Female who presents with a chief complaint of Left hip fracture (23 Dec 2018 12:42)      INTERVAL HPI/OVERNIGHT EVENTS: Patient seen and examined. NAD. Still lethargic.    Vital Signs Last 24 Hrs  T(C): 38.1 (23 Dec 2018 08:12), Max: 38.1 (23 Dec 2018 08:12)  T(F): 100.6 (23 Dec 2018 08:12), Max: 100.6 (23 Dec 2018 08:12)  HR: 83 (23 Dec 2018 08:12) (83 - 88)  BP: 115/59 (23 Dec 2018 08:12) (115/59 - 135/65)  BP(mean): --  RR: 16 (23 Dec 2018 08:12) (16 - 16)  SpO2: 96% (23 Dec 2018 08:12) (96% - 100%)        143  |  109<H>  |  35<H>  ----------------------------<  106<H>  4.1   |  28  |  0.89    Ca    7.8<L>      23 Dec 2018 06:16    TPro  4.7<L>  /  Alb  x   /  TBili  x   /  DBili  x   /  AST  x   /  ALT  x   /  AlkPhos  x                             9.2    8.89  )-----------( 196      ( 23 Dec 2018 06:16 )             27.2       CAPILLARY BLOOD GLUCOSE        Urinalysis Basic - ( 22 Dec 2018 09:58 )    Color: Yellow / Appearance: Clear / S.020 / pH: x  Gluc: x / Ketone: Negative  / Bili: Negative / Urobili: Negative   Blood: x / Protein: 25 mg/dL / Nitrite: Negative   Leuk Esterase: Small / RBC: 3-5 /HPF / WBC 11-25   Sq Epi: x / Non Sq Epi: Occasional / Bacteria: Few              acetaminophen   Tablet .. 650 milliGRAM(s) Oral every 6 hours PRN  ascorbic acid 500 milliGRAM(s) Oral two times a day  carbidopa/levodopa  25/100 1 Tablet(s) Oral three times a day  diphenhydrAMINE 25 milliGRAM(s) Oral at bedtime PRN  docusate sodium 100 milliGRAM(s) Oral three times a day  enoxaparin Injectable 40 milliGRAM(s) SubCutaneous every 24 hours  ferrous    sulfate 325 milliGRAM(s) Oral three times a day with meals  folic acid 1 milliGRAM(s) Oral daily  HYDROmorphone  Injectable 0.5 milliGRAM(s) IV Push every 6 hours PRN  iron sucrose Injectable 100 milliGRAM(s) IV Push every 24 hours  magnesium oxide 400 milliGRAM(s) Oral daily  multivitamin 1 Tablet(s) Oral daily  ondansetron Injectable 4 milliGRAM(s) IV Push every 6 hours PRN  oxyCODONE    IR 10 milliGRAM(s) Oral every 4 hours PRN  oxyCODONE    IR 5 milliGRAM(s) Oral every 4 hours PRN  tamsulosin 0.4 milliGRAM(s) Oral at bedtime          REVIEW OF SYSTEMS: unobtainable    Consultant(s) Notes Reviewed:  [X] YES  [ ] NO    PHYSICAL EXAM:  GENERAL: NAD  HEAD:  Atraumatic, Normocephalic  EYES: EOMI, PERRLA, conjunctiva and sclera clear  ENMT: No tonsillar erythema, exudates, or enlargement; Moist mucous membranes  NECK: Supple, No JVD  NERVOUS SYSTEM:  lethargic  CHEST/LUNG: Clear to auscultation bilaterally; No rales, rhonchi, wheezing,  HEART: Regular rate and rhythm  ABDOMEN: Soft, Nontender, Nondistended; Bowel sounds present  EXTREMITIES:  No clubbing, cyanosis, or edema  LYMPH: No lymphadenopathy noted  SKIN: No rashes      Advanced care planning discussed with patient/family [X] YES   [ ] NO    Advanced care planning discussed with patient/family. Advanced care planning forms reviewed/discussed/completed. 20 minutes spent.

## 2018-12-23 NOTE — PROGRESS NOTE ADULT - SUBJECTIVE AND OBJECTIVE BOX
Patient is a 83y old  Female who presents with a chief complaint of Left hip fracture (23 Dec 2018 07:30)       Pt is seen and examined  pt is awake and lying in bed/out of bed to chair  pt seems comfortable and denies any complaints at this time    HPI:  SIMONE RIVERA is an 84 YO Female brought to ER because of left hip pain after a fall at home.  Patient was unable to get up from floor after a fall due to pain in left hip.  No LOC patient tripped and fall onto left side at home. No head injury, headache, neck or back pain. (19 Dec 2018 22:09)         ROS:  Negative except for:    MEDICATIONS  (STANDING):  ascorbic acid 500 milliGRAM(s) Oral two times a day  carbidopa/levodopa  25/100 1 Tablet(s) Oral three times a day  docusate sodium 100 milliGRAM(s) Oral three times a day  enoxaparin Injectable 40 milliGRAM(s) SubCutaneous every 24 hours  ferrous    sulfate 325 milliGRAM(s) Oral three times a day with meals  folic acid 1 milliGRAM(s) Oral daily  iron sucrose Injectable 100 milliGRAM(s) IV Push every 24 hours  magnesium oxide 400 milliGRAM(s) Oral daily  multivitamin 1 Tablet(s) Oral daily  tamsulosin 0.4 milliGRAM(s) Oral at bedtime    MEDICATIONS  (PRN):  acetaminophen   Tablet .. 650 milliGRAM(s) Oral every 6 hours PRN Temp greater or equal to 38C (100.4F), Mild Pain (1 - 3)  diphenhydrAMINE 25 milliGRAM(s) Oral at bedtime PRN Insomnia  HYDROmorphone  Injectable 0.5 milliGRAM(s) IV Push every 6 hours PRN breakthrough pain  ondansetron Injectable 4 milliGRAM(s) IV Push every 6 hours PRN Nausea and/or Vomiting  oxyCODONE    IR 10 milliGRAM(s) Oral every 4 hours PRN Severe Pain (7 - 10)  oxyCODONE    IR 5 milliGRAM(s) Oral every 4 hours PRN Moderate Pain (4 - 6)      Allergies    No Known Allergies    Intolerances        Vital Signs Last 24 Hrs  T(C): 38.1 (23 Dec 2018 08:12), Max: 38.1 (23 Dec 2018 08:12)  T(F): 100.6 (23 Dec 2018 08:12), Max: 100.6 (23 Dec 2018 08:12)  HR: 83 (23 Dec 2018 08:12) (83 - 88)  BP: 115/59 (23 Dec 2018 08:12) (115/59 - 135/65)  BP(mean): --  RR: 16 (23 Dec 2018 08:12) (16 - 16)  SpO2: 96% (23 Dec 2018 08:12) (96% - 100%)    PHYSICAL EXAM  General: adult in NAD  HEENT: clear oropharynx, anicteric sclera, pink conjunctiva  Neck: supple  CV: normal S1/S2 with no murmur rubs or gallops  Lungs: positive air movement b/l ant lungs,clear to auscultation, no wheezes, no rales  Abdomen: soft non-tender non-distended, no hepatosplenomegaly  Ext: no clubbing cyanosis or edema  Skin: no rashes and no petechiae  Neuro: alert and oriented X 4, no focal deficits  LABS:                          9.2    8.89  )-----------( 196      ( 23 Dec 2018 06:16 )             27.2         Mean Cell Volume : 85.3 fl  Mean Cell Hemoglobin : 28.8 pg  Mean Cell Hemoglobin Concentration : 33.8 gm/dL  Auto Neutrophil # : x  Auto Lymphocyte # : x  Auto Monocyte # : x  Auto Eosinophil # : x  Auto Basophil # : x  Auto Neutrophil % : x  Auto Lymphocyte % : x  Auto Monocyte % : x  Auto Eosinophil % : x  Auto Basophil % : x    Serial CBC's  12-23 @ 06:16  Hct-27.2 / Hgb-9.2 / Plat-196 / RBC-3.19 / WBC-8.89          Serial CBC's  12-22 @ 08:19  Hct-26.0 / Hgb-9.0 / Plat-159 / RBC-3.11 / WBC-9.11          Serial CBC's  12-21 @ 20:49  Hct-27.2 / Hgb-9.5 / Plat--- / RBC--- / WBC---          Serial CBC's  12-21 @ 09:21  Hct-20.6 / Hgb-7.0 / Plat-210 / RBC-2.39 / WBC-12.85          Serial CBC's  12-20 @ 17:26  Hct-27.1 / Hgb-9.0 / Plat-214 / RBC-3.16 / WBC-19.03            12-23    143  |  109<H>  |  35<H>  ----------------------------<  106<H>  4.1   |  28  |  0.89    Ca    7.8<L>      23 Dec 2018 06:16    TPro  4.7<L>  /  Alb  x   /  TBili  x   /  DBili  x   /  AST  x   /  ALT  x   /  AlkPhos  x   12-22          Iron - Total Binding Capacity.: 212 ug/dL (12-22-18 @ 09:57)  Ferritin, Serum: 216 ng/mL (12-22-18 @ 09:56)  Vitamin B12, Serum: 541 pg/mL (12-22-18 @ 09:56)  Folate, Serum: >20.0 ng/mL (12-22-18 @ 09:56)  Reticulocyte Percent: 1.6 % (12-22-18 @ 08:19)                BLOOD SMEAR INTERPRETATION:       RADIOLOGY & ADDITIONAL STUDIES: Patient is a 83y old  Female who presents with a chief complaint of Left hip fracture (23 Dec 2018 07:30)       Pt is seen and examined  pt is awake and is sitting in a chair, son "gustabo" is at bedside  pt seems comfortable and denies any complaints at this time    HPI:  SIMONE RIVERA is an 84 YO Female brought to ER because of left hip pain after a fall at home.  Patient was unable to get up from floor after a fall due to pain in left hip.  No LOC patient tripped and fall onto left side at home. No head injury, headache, neck or back pain. (19 Dec 2018 22:09)         MEDICATIONS  (STANDING):  ascorbic acid 500 milliGRAM(s) Oral two times a day  carbidopa/levodopa  25/100 1 Tablet(s) Oral three times a day  docusate sodium 100 milliGRAM(s) Oral three times a day  enoxaparin Injectable 40 milliGRAM(s) SubCutaneous every 24 hours  ferrous    sulfate 325 milliGRAM(s) Oral three times a day with meals  folic acid 1 milliGRAM(s) Oral daily  iron sucrose Injectable 100 milliGRAM(s) IV Push every 24 hours  magnesium oxide 400 milliGRAM(s) Oral daily  multivitamin 1 Tablet(s) Oral daily  tamsulosin 0.4 milliGRAM(s) Oral at bedtime    MEDICATIONS  (PRN):  acetaminophen   Tablet .. 650 milliGRAM(s) Oral every 6 hours PRN Temp greater or equal to 38C (100.4F), Mild Pain (1 - 3)  diphenhydrAMINE 25 milliGRAM(s) Oral at bedtime PRN Insomnia  HYDROmorphone  Injectable 0.5 milliGRAM(s) IV Push every 6 hours PRN breakthrough pain  ondansetron Injectable 4 milliGRAM(s) IV Push every 6 hours PRN Nausea and/or Vomiting  oxyCODONE    IR 10 milliGRAM(s) Oral every 4 hours PRN Severe Pain (7 - 10)  oxyCODONE    IR 5 milliGRAM(s) Oral every 4 hours PRN Moderate Pain (4 - 6)      Allergies    No Known Allergies    Intolerances        Vital Signs Last 24 Hrs  T(C): 38.1 (23 Dec 2018 08:12), Max: 38.1 (23 Dec 2018 08:12)  T(F): 100.6 (23 Dec 2018 08:12), Max: 100.6 (23 Dec 2018 08:12)  HR: 83 (23 Dec 2018 08:12) (83 - 88)  BP: 115/59 (23 Dec 2018 08:12) (115/59 - 135/65)  BP(mean): --  RR: 16 (23 Dec 2018 08:12) (16 - 16)  SpO2: 96% (23 Dec 2018 08:12) (96% - 100%)    PHYSICAL EXAM  General: adult in NAD  HEENT: clear oropharynx, anicteric sclera, pink conjunctiva  Neck: supple  CV: normal S1/S2 with no murmur rubs or gallops  Lungs: positive air movement b/l ant lungs,clear to auscultation, no wheezes, no rales  Abdomen: soft non-tender non-distended, no hepatosplenomegaly  Ext: no clubbing cyanosis or edema  Skin: no rashes and no petechiae  Neuro: alert and oriented X 4, no focal deficits  LABS:                          9.2    8.89  )-----------( 196      ( 23 Dec 2018 06:16 )             27.2         Mean Cell Volume : 85.3 fl  Mean Cell Hemoglobin : 28.8 pg  Mean Cell Hemoglobin Concentration : 33.8 gm/dL  Auto Neutrophil # : x  Auto Lymphocyte # : x  Auto Monocyte # : x  Auto Eosinophil # : x  Auto Basophil # : x  Auto Neutrophil % : x  Auto Lymphocyte % : x  Auto Monocyte % : x  Auto Eosinophil % : x  Auto Basophil % : x    Serial CBC's  12-23 @ 06:16  Hct-27.2 / Hgb-9.2 / Plat-196 / RBC-3.19 / WBC-8.89          Serial CBC's  12-22 @ 08:19  Hct-26.0 / Hgb-9.0 / Plat-159 / RBC-3.11 / WBC-9.11          Serial CBC's  12-21 @ 20:49  Hct-27.2 / Hgb-9.5 / Plat--- / RBC--- / WBC---          Serial CBC's  12-21 @ 09:21  Hct-20.6 / Hgb-7.0 / Plat-210 / RBC-2.39 / WBC-12.85          Serial CBC's  12-20 @ 17:26  Hct-27.1 / Hgb-9.0 / Plat-214 / RBC-3.16 / WBC-19.03            12-23    143  |  109<H>  |  35<H>  ----------------------------<  106<H>  4.1   |  28  |  0.89    Ca    7.8<L>      23 Dec 2018 06:16    TPro  4.7<L>  /  Alb  x   /  TBili  x   /  DBili  x   /  AST  x   /  ALT  x   /  AlkPhos  x   12-22          Iron - Total Binding Capacity.: 212 ug/dL (12-22-18 @ 09:57)  Ferritin, Serum: 216 ng/mL (12-22-18 @ 09:56)  Vitamin B12, Serum: 541 pg/mL (12-22-18 @ 09:56)  Folate, Serum: >20.0 ng/mL (12-22-18 @ 09:56)  Reticulocyte Percent: 1.6 % (12-22-18 @ 08:19)                BLOOD SMEAR INTERPRETATION: Normal WBC series and morphology, no apparent blast cells or immature wbc, no schistocytes or fragmented rbc, platelets are adequate, no clumping of platelets or giant platelets.

## 2018-12-23 NOTE — PROVIDER CONTACT NOTE (OTHER) - SITUATION
Pt is resting comfortably. Pt has oral temp of 100.2, Pt had temp-100.9 rectally night before and was seen by MD today

## 2018-12-24 LAB
ALBUMIN SERPL ELPH-MCNC: 2.1 G/DL — LOW (ref 3.3–5)
ALP SERPL-CCNC: 22 U/L — LOW (ref 40–120)
ALT FLD-CCNC: <6 U/L — LOW (ref 12–78)
AMMONIA BLD-MCNC: 33 UMOL/L — HIGH (ref 11–32)
ANION GAP SERPL CALC-SCNC: 7 MMOL/L — SIGNIFICANT CHANGE UP (ref 5–17)
AST SERPL-CCNC: 29 U/L — SIGNIFICANT CHANGE UP (ref 15–37)
BASOPHILS # BLD AUTO: 0.02 K/UL — SIGNIFICANT CHANGE UP (ref 0–0.2)
BASOPHILS NFR BLD AUTO: 0.3 % — SIGNIFICANT CHANGE UP (ref 0–2)
BILIRUB DIRECT SERPL-MCNC: 0.2 MG/DL — SIGNIFICANT CHANGE UP (ref 0.05–0.2)
BILIRUB INDIRECT FLD-MCNC: 0.8 MG/DL — SIGNIFICANT CHANGE UP (ref 0.2–1)
BILIRUB SERPL-MCNC: 1 MG/DL — SIGNIFICANT CHANGE UP (ref 0.2–1.2)
BUN SERPL-MCNC: 31 MG/DL — HIGH (ref 7–23)
CALCIUM SERPL-MCNC: 7.9 MG/DL — LOW (ref 8.5–10.1)
CHLORIDE SERPL-SCNC: 109 MMOL/L — HIGH (ref 96–108)
CO2 SERPL-SCNC: 26 MMOL/L — SIGNIFICANT CHANGE UP (ref 22–31)
CREAT SERPL-MCNC: 0.73 MG/DL — SIGNIFICANT CHANGE UP (ref 0.5–1.3)
EOSINOPHIL # BLD AUTO: 0.14 K/UL — SIGNIFICANT CHANGE UP (ref 0–0.5)
EOSINOPHIL NFR BLD AUTO: 2.2 % — SIGNIFICANT CHANGE UP (ref 0–6)
GLUCOSE SERPL-MCNC: 96 MG/DL — SIGNIFICANT CHANGE UP (ref 70–99)
HCT VFR BLD CALC: 27.4 % — LOW (ref 34.5–45)
HGB BLD-MCNC: 9.1 G/DL — LOW (ref 11.5–15.5)
IMM GRANULOCYTES NFR BLD AUTO: 0.5 % — SIGNIFICANT CHANGE UP (ref 0–1.5)
LYMPHOCYTES # BLD AUTO: 0.9 K/UL — LOW (ref 1–3.3)
LYMPHOCYTES # BLD AUTO: 14 % — SIGNIFICANT CHANGE UP (ref 13–44)
MAGNESIUM SERPL-MCNC: 1.9 MG/DL — SIGNIFICANT CHANGE UP (ref 1.6–2.6)
MCHC RBC-ENTMCNC: 28.5 PG — SIGNIFICANT CHANGE UP (ref 27–34)
MCHC RBC-ENTMCNC: 33.2 GM/DL — SIGNIFICANT CHANGE UP (ref 32–36)
MCV RBC AUTO: 85.9 FL — SIGNIFICANT CHANGE UP (ref 80–100)
MONOCYTES # BLD AUTO: 0.66 K/UL — SIGNIFICANT CHANGE UP (ref 0–0.9)
MONOCYTES NFR BLD AUTO: 10.2 % — SIGNIFICANT CHANGE UP (ref 2–14)
NEUTROPHILS # BLD AUTO: 4.69 K/UL — SIGNIFICANT CHANGE UP (ref 1.8–7.4)
NEUTROPHILS NFR BLD AUTO: 72.8 % — SIGNIFICANT CHANGE UP (ref 43–77)
NRBC # BLD: 0 /100 WBCS — SIGNIFICANT CHANGE UP (ref 0–0)
PLATELET # BLD AUTO: 214 K/UL — SIGNIFICANT CHANGE UP (ref 150–400)
POTASSIUM SERPL-MCNC: 4.1 MMOL/L — SIGNIFICANT CHANGE UP (ref 3.5–5.3)
POTASSIUM SERPL-SCNC: 4.1 MMOL/L — SIGNIFICANT CHANGE UP (ref 3.5–5.3)
PROCALCITONIN SERPL-MCNC: 0.14 NG/ML — HIGH (ref 0–0.04)
PROT SERPL-MCNC: 5.1 G/DL — LOW (ref 6–8.3)
RBC # BLD: 3.19 M/UL — LOW (ref 3.8–5.2)
RBC # FLD: 13.2 % — SIGNIFICANT CHANGE UP (ref 10.3–14.5)
SODIUM SERPL-SCNC: 142 MMOL/L — SIGNIFICANT CHANGE UP (ref 135–145)
WBC # BLD: 6.44 K/UL — SIGNIFICANT CHANGE UP (ref 3.8–10.5)
WBC # FLD AUTO: 6.44 K/UL — SIGNIFICANT CHANGE UP (ref 3.8–10.5)

## 2018-12-24 RX ORDER — LACTULOSE 10 G/15ML
20 SOLUTION ORAL
Qty: 0 | Refills: 0 | Status: DISCONTINUED | OUTPATIENT
Start: 2018-12-24 | End: 2018-12-25

## 2018-12-24 RX ORDER — SODIUM CHLORIDE 9 MG/ML
1000 INJECTION INTRAMUSCULAR; INTRAVENOUS; SUBCUTANEOUS
Qty: 0 | Refills: 0 | Status: DISCONTINUED | OUTPATIENT
Start: 2018-12-24 | End: 2018-12-25

## 2018-12-24 RX ADMIN — LACTULOSE 20 GRAM(S): 10 SOLUTION ORAL at 11:25

## 2018-12-24 RX ADMIN — CARBIDOPA AND LEVODOPA 1 TABLET(S): 25; 100 TABLET ORAL at 23:04

## 2018-12-24 RX ADMIN — CARBIDOPA AND LEVODOPA 1 TABLET(S): 25; 100 TABLET ORAL at 15:50

## 2018-12-24 RX ADMIN — POLYETHYLENE GLYCOL 3350 17 GRAM(S): 17 POWDER, FOR SOLUTION ORAL at 11:25

## 2018-12-24 RX ADMIN — MAGNESIUM OXIDE 400 MG ORAL TABLET 400 MILLIGRAM(S): 241.3 TABLET ORAL at 11:25

## 2018-12-24 RX ADMIN — ENOXAPARIN SODIUM 40 MILLIGRAM(S): 100 INJECTION SUBCUTANEOUS at 08:52

## 2018-12-24 RX ADMIN — Medication 100 MILLIGRAM(S): at 15:49

## 2018-12-24 RX ADMIN — SENNA PLUS 2 TABLET(S): 8.6 TABLET ORAL at 23:04

## 2018-12-24 RX ADMIN — Medication 500 MILLIGRAM(S): at 17:41

## 2018-12-24 RX ADMIN — SODIUM CHLORIDE 50 MILLILITER(S): 9 INJECTION INTRAMUSCULAR; INTRAVENOUS; SUBCUTANEOUS at 15:49

## 2018-12-24 RX ADMIN — Medication 325 MILLIGRAM(S): at 11:24

## 2018-12-24 RX ADMIN — LACTULOSE 20 GRAM(S): 10 SOLUTION ORAL at 17:41

## 2018-12-24 RX ADMIN — Medication 325 MILLIGRAM(S): at 08:52

## 2018-12-24 RX ADMIN — Medication 325 MILLIGRAM(S): at 17:41

## 2018-12-24 RX ADMIN — CARBIDOPA AND LEVODOPA 1 TABLET(S): 25; 100 TABLET ORAL at 05:24

## 2018-12-24 RX ADMIN — Medication 1 MILLIGRAM(S): at 11:25

## 2018-12-24 RX ADMIN — TAMSULOSIN HYDROCHLORIDE 0.4 MILLIGRAM(S): 0.4 CAPSULE ORAL at 23:04

## 2018-12-24 RX ADMIN — Medication 100 MILLIGRAM(S): at 23:04

## 2018-12-24 RX ADMIN — Medication 1 TABLET(S): at 11:25

## 2018-12-24 RX ADMIN — IRON SUCROSE 100 MILLIGRAM(S): 20 INJECTION, SOLUTION INTRAVENOUS at 11:25

## 2018-12-24 RX ADMIN — Medication 500 MILLIGRAM(S): at 05:24

## 2018-12-24 NOTE — PROGRESS NOTE ADULT - ASSESSMENT
Dr Nestor Blanton  [phone], [facility],, case d/w dr blanton and as per dr blanton out patient hb was 10.6 in 12/2018  83/f was admitted after fall and had left hip fx, underwent lt hip surgery 12/20/18. Hematology was consulted for anemia, patient received 2 units prbc on 12/21/18  patient is with hx of anemia as out patient, as per daughter ray and as per dr blanton--patient never had hematology evaluation  patient with some weight loss about 9 pounds in last 3 months--as i d/w daughter and dr blanton. per dr blanton patient/family had refused work up for weight loss with him. Daughter thinks weight loss as she is not eating well lately.  i d/w daughter ray on 12/21 at consult as well as on 12/22/2018--advised further work up including ct scan c/a/p , gi evaluation and gi work up--pt daughter ray refuses again any further work up for weight loss. i expressed my concern for malignancy, considering her age and weight loss. i stressed for further work up. family/daughter refuses any further work up for weight loss with clear understanding potential serious but treatable cancer/pathology/malignancy may remain undiagnosed/untreated and she may die/become disabled from undiagnosed/untreated malignancy/gi pathology/cancer  patient daughter also refuses bm asp and bx to definitively exclude hematology pathology/mds/leukemia/any other pathology  12/23/18--neurology/pcp are following, had ct head and for intracranial mass--pt son prefer further work up and plan as per and with neurology, refuses ct body

## 2018-12-24 NOTE — PROGRESS NOTE ADULT - PROBLEM SELECTOR PLAN 1
likely multifactorial  had an anemia as out patient as per pt family and pt pcp, did not had a work up per pt/family/pcp  s/p lt hip surgery  s/p prbc 12/21  iron profile 12/22/18 (partly affected from prbc 12/21)--low iron/transferrin saturation, ferritin is an acute phase reactant and could be artifactually elevated in this patient  patient is on iv iron as looking for a rapid response, day 3 (3/3) today, po fe 325 qd from 12/25--short course for 30 days  hb at 9.1--low but stable  monitor serial h/h--transfuse prbc as needed for symptomatic anemia or if hb is under 7  pt daughter refuses ct scan/radiology scan/bm bx/ any further work up.  I d/w estelita montejo at bedside 12/23--who is in agreement of above and refuses any extensive work up(for anemia and skight weight loss), aware concern is a malignancy and with family refusal patient may die/become disabled from undiagnosed/untreated pathology/cancer. likely multifactorial  had an anemia as out patient as per pt family and pt pcp, did not had a work up per pt/family/pcp  s/p lt hip surgery  s/p prbc 12/21  iron profile 12/22/18 (partly affected from prbc 12/21)--low iron/transferrin saturation, ferritin is an acute phase reactant and could be artifactually elevated in this patient  patient is on iv iron as looking for a rapid response, day 3 (3/3) today, patient is on po iron supplement as per pcp  hb at 9.1--low but stable  monitor serial h/h--transfuse prbc as needed for symptomatic anemia or if hb is under 7  pt daughter refuses ct scan/radiology scan/bm bx/ any further work up.  I d/w estelita montejo at bedside 12/23--who is in agreement of above and refuses any extensive work up(for anemia and skight weight loss), aware concern is a malignancy and with family refusal patient may die/become disabled from undiagnosed/untreated pathology/cancer.

## 2018-12-24 NOTE — PROGRESS NOTE ADULT - SUBJECTIVE AND OBJECTIVE BOX
Patient is a 83y old  Female who presents with a chief complaint of Left hip fracture (23 Dec 2018 17:05)       Pt is seen and examined  pt is awake and lying in bed/out of bed to chair  pt seems comfortable and denies any complaints at this time    HPI:  SIMONE RIVERA is an 84 YO Female brought to ER because of left hip pain after a fall at home.  Patient was unable to get up from floor after a fall due to pain in left hip.  No LOC patient tripped and fall onto left side at home. No head injury, headache, neck or back pain. (19 Dec 2018 22:09)         ROS:  Negative except for:    MEDICATIONS  (STANDING):  ascorbic acid 500 milliGRAM(s) Oral two times a day  carbidopa/levodopa  25/100 1 Tablet(s) Oral three times a day  docusate sodium 100 milliGRAM(s) Oral three times a day  enoxaparin Injectable 40 milliGRAM(s) SubCutaneous every 24 hours  ferrous    sulfate 325 milliGRAM(s) Oral three times a day with meals  folic acid 1 milliGRAM(s) Oral daily  iron sucrose Injectable 100 milliGRAM(s) IV Push every 24 hours  lactulose Syrup 20 Gram(s) Oral two times a day  magnesium oxide 400 milliGRAM(s) Oral daily  multivitamin 1 Tablet(s) Oral daily  polyethylene glycol 3350 17 Gram(s) Oral daily  senna 2 Tablet(s) Oral at bedtime  tamsulosin 0.4 milliGRAM(s) Oral at bedtime    MEDICATIONS  (PRN):  acetaminophen   Tablet .. 650 milliGRAM(s) Oral every 6 hours PRN Temp greater or equal to 38C (100.4F), Mild Pain (1 - 3)  diphenhydrAMINE 25 milliGRAM(s) Oral at bedtime PRN Insomnia  HYDROmorphone  Injectable 0.5 milliGRAM(s) IV Push every 6 hours PRN breakthrough pain  magnesium hydroxide Suspension 30 milliLiter(s) Oral daily PRN Constipation  ondansetron Injectable 4 milliGRAM(s) IV Push every 6 hours PRN Nausea and/or Vomiting  oxyCODONE    IR 10 milliGRAM(s) Oral every 4 hours PRN Severe Pain (7 - 10)  oxyCODONE    IR 5 milliGRAM(s) Oral every 4 hours PRN Moderate Pain (4 - 6)      Allergies    No Known Allergies    Intolerances        Vital Signs Last 24 Hrs  T(C): 36.9 (24 Dec 2018 08:04), Max: 37.8 (23 Dec 2018 16:22)  T(F): 98.4 (24 Dec 2018 08:04), Max: 100.1 (23 Dec 2018 16:22)  HR: 71 (24 Dec 2018 08:04) (71 - 80)  BP: 114/72 (24 Dec 2018 08:04) (114/72 - 127/71)  BP(mean): --  RR: 18 (24 Dec 2018 08:04) (16 - 18)  SpO2: 99% (24 Dec 2018 08:04) (75% - 99%)    PHYSICAL EXAM  General: adult in NAD  HEENT: clear oropharynx, anicteric sclera, pink conjunctiva  Neck: supple  CV: normal S1/S2 with no murmur rubs or gallops  Lungs: positive air movement b/l ant lungs,clear to auscultation, no wheezes, no rales  Abdomen: soft non-tender non-distended, no hepatosplenomegaly  Ext: no clubbing cyanosis or edema  Skin: no rashes and no petechiae  Neuro: alert and oriented X 4, no focal deficits  LABS:                          9.1    6.44  )-----------( 214      ( 24 Dec 2018 09:19 )             27.4         Mean Cell Volume : 85.9 fl  Mean Cell Hemoglobin : 28.5 pg  Mean Cell Hemoglobin Concentration : 33.2 gm/dL  Auto Neutrophil # : 4.69 K/uL  Auto Lymphocyte # : 0.90 K/uL  Auto Monocyte # : 0.66 K/uL  Auto Eosinophil # : 0.14 K/uL  Auto Basophil # : 0.02 K/uL  Auto Neutrophil % : 72.8 %  Auto Lymphocyte % : 14.0 %  Auto Monocyte % : 10.2 %  Auto Eosinophil % : 2.2 %  Auto Basophil % : 0.3 %    Serial CBC's  12-24 @ 09:19  Hct-27.4 / Hgb-9.1 / Plat-214 / RBC-3.19 / WBC-6.44          Serial CBC's  12-23 @ 06:16  Hct-27.2 / Hgb-9.2 / Plat-196 / RBC-3.19 / WBC-8.89          Serial CBC's  12-22 @ 08:19  Hct-26.0 / Hgb-9.0 / Plat-159 / RBC-3.11 / WBC-9.11          Serial CBC's  12-21 @ 20:49  Hct-27.2 / Hgb-9.5 / Plat--- / RBC--- / WBC---          Serial CBC's  12-21 @ 09:21  Hct-20.6 / Hgb-7.0 / Plat-210 / RBC-2.39 / WBC-12.85            12-24    142  |  109<H>  |  31<H>  ----------------------------<  96  4.1   |  26  |  0.73    Ca    7.9<L>      24 Dec 2018 06:43  Mg     1.9     12-24    TPro  5.1<L>  /  Alb  2.1<L>  /  TBili  1.0  /  DBili  .20  /  AST  29  /  ALT  <6<L>  /  AlkPhos  22<L>  12-24          Iron - Total Binding Capacity.: 212 ug/dL (12-22-18 @ 09:57)  Ferritin, Serum: 216 ng/mL (12-22-18 @ 09:56)  Vitamin B12, Serum: 541 pg/mL (12-22-18 @ 09:56)  Folate, Serum: >20.0 ng/mL (12-22-18 @ 09:56)  Reticulocyte Percent: 1.6 % (12-22-18 @ 08:19)                BLOOD SMEAR INTERPRETATION:       RADIOLOGY & ADDITIONAL STUDIES: Patient is a 83y old  Female who presents with a chief complaint of Left hip fracture (23 Dec 2018 17:05)       Pt is seen and examined  pt is awake and is sitting in bed, getting bedside physiotherapy, patient son is at bedside.  pt seems comfortable and denies any complaints at this time    HPI:  SIMONE RIVERA is an 82 YO Female brought to ER because of left hip pain after a fall at home.  Patient was unable to get up from floor after a fall due to pain in left hip.  No LOC patient tripped and fall onto left side at home. No head injury, headache, neck or back pain. (19 Dec 2018 22:09)       MEDICATIONS  (STANDING):  ascorbic acid 500 milliGRAM(s) Oral two times a day  carbidopa/levodopa  25/100 1 Tablet(s) Oral three times a day  docusate sodium 100 milliGRAM(s) Oral three times a day  enoxaparin Injectable 40 milliGRAM(s) SubCutaneous every 24 hours  ferrous    sulfate 325 milliGRAM(s) Oral three times a day with meals  folic acid 1 milliGRAM(s) Oral daily  iron sucrose Injectable 100 milliGRAM(s) IV Push every 24 hours  lactulose Syrup 20 Gram(s) Oral two times a day  magnesium oxide 400 milliGRAM(s) Oral daily  multivitamin 1 Tablet(s) Oral daily  polyethylene glycol 3350 17 Gram(s) Oral daily  senna 2 Tablet(s) Oral at bedtime  tamsulosin 0.4 milliGRAM(s) Oral at bedtime    MEDICATIONS  (PRN):  acetaminophen   Tablet .. 650 milliGRAM(s) Oral every 6 hours PRN Temp greater or equal to 38C (100.4F), Mild Pain (1 - 3)  diphenhydrAMINE 25 milliGRAM(s) Oral at bedtime PRN Insomnia  HYDROmorphone  Injectable 0.5 milliGRAM(s) IV Push every 6 hours PRN breakthrough pain  magnesium hydroxide Suspension 30 milliLiter(s) Oral daily PRN Constipation  ondansetron Injectable 4 milliGRAM(s) IV Push every 6 hours PRN Nausea and/or Vomiting  oxyCODONE    IR 10 milliGRAM(s) Oral every 4 hours PRN Severe Pain (7 - 10)  oxyCODONE    IR 5 milliGRAM(s) Oral every 4 hours PRN Moderate Pain (4 - 6)      Allergies    No Known Allergies    Intolerances        Vital Signs Last 24 Hrs  T(C): 36.9 (24 Dec 2018 08:04), Max: 37.8 (23 Dec 2018 16:22)  T(F): 98.4 (24 Dec 2018 08:04), Max: 100.1 (23 Dec 2018 16:22)  HR: 71 (24 Dec 2018 08:04) (71 - 80)  BP: 114/72 (24 Dec 2018 08:04) (114/72 - 127/71)  BP(mean): --  RR: 18 (24 Dec 2018 08:04) (16 - 18)  SpO2: 99% (24 Dec 2018 08:04) (75% - 99%)    PHYSICAL EXAM  General: adult in NAD  HEENT: clear oropharynx, anicteric sclera, pink conjunctiva  Neck: supple  CV: normal S1/S2 with no murmur rubs or gallops  Lungs: positive air movement b/l ant lungs,clear to auscultation, no wheezes, no rales  Abdomen: soft non-tender non-distended, no hepatosplenomegaly  Ext: no clubbing cyanosis or edema  Skin: no rashes and no petechiae  Neuro: alert and oriented X 4, no focal deficits  LABS:                          9.1    6.44  )-----------( 214      ( 24 Dec 2018 09:19 )             27.4         Mean Cell Volume : 85.9 fl  Mean Cell Hemoglobin : 28.5 pg  Mean Cell Hemoglobin Concentration : 33.2 gm/dL  Auto Neutrophil # : 4.69 K/uL  Auto Lymphocyte # : 0.90 K/uL  Auto Monocyte # : 0.66 K/uL  Auto Eosinophil # : 0.14 K/uL  Auto Basophil # : 0.02 K/uL  Auto Neutrophil % : 72.8 %  Auto Lymphocyte % : 14.0 %  Auto Monocyte % : 10.2 %  Auto Eosinophil % : 2.2 %  Auto Basophil % : 0.3 %    Serial CBC's  12-24 @ 09:19  Hct-27.4 / Hgb-9.1 / Plat-214 / RBC-3.19 / WBC-6.44          Serial CBC's  12-23 @ 06:16  Hct-27.2 / Hgb-9.2 / Plat-196 / RBC-3.19 / WBC-8.89          Serial CBC's  12-22 @ 08:19  Hct-26.0 / Hgb-9.0 / Plat-159 / RBC-3.11 / WBC-9.11          Serial CBC's  12-21 @ 20:49  Hct-27.2 / Hgb-9.5 / Plat--- / RBC--- / WBC---          Serial CBC's  12-21 @ 09:21  Hct-20.6 / Hgb-7.0 / Plat-210 / RBC-2.39 / WBC-12.85            12-24    142  |  109<H>  |  31<H>  ----------------------------<  96  4.1   |  26  |  0.73    Ca    7.9<L>      24 Dec 2018 06:43  Mg     1.9     12-24    TPro  5.1<L>  /  Alb  2.1<L>  /  TBili  1.0  /  DBili  .20  /  AST  29  /  ALT  <6<L>  /  AlkPhos  22<L>  12-24          Iron - Total Binding Capacity.: 212 ug/dL (12-22-18 @ 09:57)  Ferritin, Serum: 216 ng/mL (12-22-18 @ 09:56)  Vitamin B12, Serum: 541 pg/mL (12-22-18 @ 09:56)  Folate, Serum: >20.0 ng/mL (12-22-18 @ 09:56)  Reticulocyte Percent: 1.6 % (12-22-18 @ 08:19)

## 2018-12-24 NOTE — PROGRESS NOTE ADULT - SUBJECTIVE AND OBJECTIVE BOX
Current patient class: Inpatient  The patient is currently on Hospital Day: 2      The patient was admitted to the hospital at [phone] on 9/2/17 for the following diagnosis:  Asthenia [R53 1]  Weakness [R53 1]  Pressure ulcer, ankle [L89 [id number]]       There is documentation in the medical record of an expected length of stay of at least 2 midnights  The patient is therefore expected to satisfy the 2 midnight benchmark and given the 2 midnight presumption is appropriate for INPATIENT ADMISSION  Given this expectation of a satisfying stay, CMS instructs us that the patient is most often appropriate for inpatient admission under part A provided medical necessity is documented in the chart  After review of the relevant documentation, labs, vital signs and test results, the patient is appropriate for INPATIENT ADMISSION  Admission to the hospital as an inpatient is a complex decision making process which requires the practitioner to consider the patients presenting complaint, history and physical examination and all relevant testing  With this in mind, in this case, the patient was deemed appropriate for INPATIENT ADMISSION  After review of the documentation and testing available at the time of the admission I concur with this clinical determination of medical necessity  Rationale is as follows:     The patient is a 76 yrs old Male who presented to the ED at 9/2/2017 10:54 AM with a chief complaint of Weakness - Generalized (pt woke up this morning just feeling very weak and unable to get up   pt denies fevers, n/v   denies any pain or sick contacts )    The patients vitals on arrival were ED Triage Vitals   Temperature Pulse Respirations Blood Pressure SpO2   09/02/17 1041 09/02/17 1041 09/02/17 1041 09/02/17 1043 09/02/17 1041   97 6 °F (36 4 °C) 81 18 139/66 97 %      Temp Source Heart Rate Source Patient Position BP Location FiO2 (%)   09/02/17 1041 09/02/17 1347 09/02/17 1347 09/02/17 1347 --   Oral Monitor Lying Right arm       Pain Score       09/02/17 1041       No Pain           Past Medical History:   Diagnosis Date    Anemia     Aortic stenosis     Atrial fibrillation     Chronic kidney disease     stage 3    COPD (chronic obstructive pulmonary disease)     Diabetes mellitus     Hyperlipidemia     Hypertension     Sleep apnea      History reviewed  No pertinent surgical history          Consults have been placed to:   IP CONSULT TO CASE MANAGEMENT  IP CONSULT TO PODIATRY  IP CONSULT TO ENDOCRINOLOGY    Vitals:    09/02/17 2350 09/03/17 0808 09/03/17 1225 09/03/17 1427   BP: 151/78 142/64 121/59 118/67   Pulse: 76 79 74 69   Resp: 20 18 18 18   Temp: 98 4 °F (36 9 °C) 98 4 °F (36 9 °C)  (!) 97 4 °F (36 3 °C)   TempSrc: Tympanic Tympanic  Tympanic   SpO2: 96% 97% 97% 97%   Weight:       Height:           Most recent labs:    Recent Labs      09/02/17   1135  09/02/17   1426  09/03/17   0625   WBC  12 46*   --    --    HGB  13 1   --    --    HCT  39 1   --    --    PLT  327   --    --    K  4 2   --   4 2   NA  138   --   138   CALCIUM  9 6   --   9 3   BUN  27*   --   29*   CREATININE  1 49*   --   1 43*   INR   --   2 90*   --    AST  15   --    --    ALT  25   --    --    ALKPHOS  99   --    --    BILITOT  0 34   --    --        Scheduled Meds:  ferrous sulfate 325 mg Oral BID   fluticasone-salmeterol 1 puff Inhalation BID   furosemide 40 mg Oral Daily   gabapentin 100 mg Oral TID   insulin glargine 24 Units Subcutaneous Q12H EFRAIN   insulin lispro 1-6 Units Subcutaneous TID AC   insulin lispro 1-6 Units Subcutaneous HS   magnesium oxide 400 mg Oral Daily   oxybutynin 5 mg Oral BID   pravastatin 40 mg Oral Daily With Dinner   tiotropium 18 mcg Inhalation Daily   warfarin 5 mg Oral Once per day on Sun Tue Wed Fri Sat   [START ON 9/4/2017] warfarin 7 5 mg Oral Once per day on Mon Thu     Continuous Infusions:   PRN Meds:   acetaminophen    magnesium hydroxide    ondansetron    Surgical procedures (if Patient is a 83y old  Female who presents with a chief complaint of Left hip fracture (23 Dec 2018 12:42)      INTERVAL HPI/OVERNIGHT EVENTS: Patient seen and examined. NAD. Still lethargic. However, last night past was very lucid and speaking clearly (as per family)    Vital Signs Last 24 Hrs  T(C): 36.9 (24 Dec 2018 08:04), Max: 37.8 (23 Dec 2018 16:22)  T(F): 98.4 (24 Dec 2018 08:04), Max: 100.1 (23 Dec 2018 16:22)  HR: 71 (24 Dec 2018 08:04) (71 - 80)  BP: 114/72 (24 Dec 2018 08:04) (114/72 - 127/71)  BP(mean): --  RR: 18 (24 Dec 2018 08:04) (16 - 18)  SpO2: 99% (24 Dec 2018 08:04) (75% - 99%)    12-24    142  |  109<H>  |  31<H>  ----------------------------<  96  4.1   |  26  |  0.73    Ca    7.9<L>      24 Dec 2018 06:43  Mg     1.9     12-24    TPro  5.1<L>  /  Alb  2.1<L>  /  TBili  1.0  /  DBili  .20  /  AST  29  /  ALT  <6<L>  /  AlkPhos  22<L>  12-24                          9.1    6.44  )-----------( 214      ( 24 Dec 2018 09:19 )             27.4       CAPILLARY BLOOD GLUCOSE                  acetaminophen   Tablet .. 650 milliGRAM(s) Oral every 6 hours PRN  ascorbic acid 500 milliGRAM(s) Oral two times a day  carbidopa/levodopa  25/100 1 Tablet(s) Oral three times a day  diphenhydrAMINE 25 milliGRAM(s) Oral at bedtime PRN  docusate sodium 100 milliGRAM(s) Oral three times a day  enoxaparin Injectable 40 milliGRAM(s) SubCutaneous every 24 hours  ferrous    sulfate 325 milliGRAM(s) Oral three times a day with meals  folic acid 1 milliGRAM(s) Oral daily  HYDROmorphone  Injectable 0.5 milliGRAM(s) IV Push every 6 hours PRN  lactulose Syrup 20 Gram(s) Oral two times a day  magnesium hydroxide Suspension 30 milliLiter(s) Oral daily PRN  magnesium oxide 400 milliGRAM(s) Oral daily  multivitamin 1 Tablet(s) Oral daily  ondansetron Injectable 4 milliGRAM(s) IV Push every 6 hours PRN  oxyCODONE    IR 10 milliGRAM(s) Oral every 4 hours PRN  oxyCODONE    IR 5 milliGRAM(s) Oral every 4 hours PRN  polyethylene glycol 3350 17 Gram(s) Oral daily  senna 2 Tablet(s) Oral at bedtime  tamsulosin 0.4 milliGRAM(s) Oral at bedtime    < from: Xray Chest 1 View- PORTABLE-Routine (12.23.18 @ 16:25) >    EXAM:  XR CHEST PORTABLE ROUTINE 1V                            PROCEDURE DATE:  12/23/2018          INTERPRETATION:  INDICATION: Fever    PRIORS: 12/19/2018    VIEWS: Portable AP radiography of the chest performed. Evaluation limited   secondary toshallow inspiration and portable technique.    FINDINGS: Heart size appears borderline enlarged. No superior mediastinal   abnormalities are identified. Hilar and right paratracheal calcifications   likely related to calcified lymph nodes. There is no evidence for focal   infiltrate, lobar consolidation or pulmonary edema. No pleural effusion   or pneumothorax is demonstrated. No mediastinal shift is noted. No   osseous fractures evident.    IMPRESSION: No evidence for focal infiltrate or lobar consolidation.                ALBER SCHULZ   This document has been electronically signed. Dec 23 2018  4:36PM                < end of copied text >      < from: Xray Abdomen 1 View PORTABLE -Routine (12.23.18 @ 16:25) >    EXAM:  XR ABDOMEN PORTABLE ROUTINE 1V                            PROCEDURE DATE:  12/23/2018          INTERPRETATION:  CLINICAL INDICATION:  Constipation    COMPARISON:  None    TECHNIQUE:  Portable supine radiography of the abdomen performed.    FINDINGS:  The bowel gas pattern is nonspecific. Fecal material is   identified within the right colon and rectum. No definite mechanical   bowel obstruction identified. No free intraperitoneal air is noted. No   abnormal intra-abdominal or pelvic calcifications demonstrated.   Degenerative change of the right hip region noted. The patient is status   post left femoral neck pinning.    IMPRESSION:  The bowel gas pattern is nonspecific. Fecal material is   identified within the right colon and rectum. No definite mechanical   bowel obstruction identified. No free intraperitoneal air is noted.                   ALBER SCHULZ   This document has been electronically signed. Dec 23 2018  4:38PM                < end of copied text >            REVIEW OF SYSTEMS: unobtainable    Consultant(s) Notes Reviewed:  [X] YES  [ ] NO    PHYSICAL EXAM:  GENERAL: NAD  HEAD:  Atraumatic, Normocephalic  EYES: EOMI, PERRLA, conjunctiva and sclera clear  ENMT: No tonsillar erythema, exudates, or enlargement; Moist mucous membranes  NECK: Supple, No JVD  NERVOUS SYSTEM:  lethargic  CHEST/LUNG: Clear to auscultation bilaterally; No rales, rhonchi, wheezing,  HEART: Regular rate and rhythm  ABDOMEN: Soft, Nontender, Nondistended; Bowel sounds present  EXTREMITIES:  No clubbing, cyanosis, or edema  LYMPH: No lymphadenopathy noted  SKIN: No rashes      Advanced care planning discussed with patient/family [X] YES   [ ] NO    Advanced care planning discussed with patient/family. Advanced care planning forms reviewed/discussed/completed. 20 minutes spent. appropriate):

## 2018-12-24 NOTE — PROGRESS NOTE ADULT - SUBJECTIVE AND OBJECTIVE BOX
ID Progress     Name: SIMONE RIVERA  Age: 83y  Gender: Female  MRN: [id number]    Interval History--Events noted, doing much better, more awake and responsive ambulated with PT, still with [facility]   Notes reviewed    Past Medical History--  No pertinent past medical history      For details regarding the patient's social history, family history, and other miscellaneous elements, please refer the initial infectious diseases consultation and/or the admitting history and physical examination for this admission.    Allergies--  Allergies    No Known Allergies    Intolerances        Medications--  Antibiotics:    Immunologic:    Other:  acetaminophen   Tablet .. PRN  ascorbic acid  carbidopa/levodopa  25/100  diphenhydrAMINE PRN  docusate sodium  enoxaparin Injectable  ferrous    sulfate  folic acid  HYDROmorphone  Injectable PRN  lactulose Syrup  magnesium hydroxide Suspension PRN  magnesium oxide  multivitamin  ondansetron Injectable PRN  oxyCODONE    IR PRN  oxyCODONE    IR PRN  polyethylene glycol 3350  senna  sodium chloride 0.9%.  tamsulosin      Review of Systems--  Review of systems unable to be obtained secondary to clinical condition.    Physical Examination--    Vital Signs: T(F): 98.4 (12-24-18 @ 08:04), Max: 100.1 (12-23-18 @ 16:22)  HR: 71 (12-24-18 @ 08:04)  BP: 114/72 (12-24-18 @ 08:04)  RR: 18 (12-24-18 @ 08:04)  SpO2: 99% (12-24-18 @ 08:04)  Wt(kg): --  General: Nontoxic-appearing Female in no acute distress lethargic but arouse able   HEENT: AT/NC. PERRL. EOMI. Anicteric. Conjunctiva pink and moist. Oropharynx clear. Dentition fair.  Neck: Not rigid. No sense of mass.  Nodes: None palpable.  Lungs: Clear bilaterally without rales, wheezing or rhonchi  Heart: Regular rate and rhythm. No Murmur. No rub. No gallop. No palpable thrill.  Abdomen: Bowel sounds present and normoactive. Soft. Nondistended. Nontender. No sense of mass. No organomegaly.  Back: No spinal tenderness. No costovertebral angle tenderness.   Extremities: No cyanosis or clubbing. No edema.   Skin: Warm. Dry. Good turgor. No rash. No vasculitic stigmata.  Psychiatric: Appropriate affect and mood for situation.         Laboratory Studies--  CBC                        9.1    6.44  )-----------( 214      ( 24 Dec 2018 09:19 )             27.4       Chemistries  12-24    142  |  109<H>  |  31<H>  ----------------------------<  96  4.1   |  26  |  0.73    Ca    7.9<L>      24 Dec 2018 06:43  Mg     1.9     12-24    TPro  5.1<L>  /  Alb  2.1<L>  /  TBili  1.0  /  DBili  .20  /  AST  29  /  ALT  <6<L>  /  AlkPhos  22<L>  12-24      Culture Data    Culture - Urine (collected 21 Dec 2018 22:48)  Source: .Urine Catheterized  Final Report (22 Dec 2018 22:45):    No growth    Culture - Blood (collected 20 Dec 2018 12:59)  Source: .Blood Blood  Preliminary Report (21 Dec 2018 13:01):    No growth to date.    Culture - Blood (collected 20 Dec 2018 12:59)  Source: .Blood Blood  Preliminary Report (21 Dec 2018 13:01):    No growth to date.    Radiology:    < from: Xray Chest 1 View- PORTABLE-Routine (12.23.18 @ 16:25) >  FINDINGS: Heart size appears borderline enlarged. No superior mediastinal   abnormalities are identified. Hilar and right paratracheal calcifications   likely related to calcified lymph nodes. There is no evidence for focal   infiltrate, lobar consolidation or pulmonary edema. No pleural effusion   or pneumothorax is demonstrated. No mediastinal shift is noted. No   osseous fractures evident.    IMPRESSION: No evidence for focal infiltrate or lobar consolidation.         CT Head No Cont (12.22.18 @ 15:45) >  FINDINGS: There is a 1.4 x 1.1 x 1.3 cm (AP x TRV x CC) hyperdense   appearing left-sided intraventricular mass which is inseparable from the   left choroid plexus in the lateral ventricular body with extension to the   ventricular trigone. There is no associated mass effect or shift of   midline structures. There is no hydrocephalus.    There is no acute intracranial hemorrhage or abnormal extra axial fluid   collection.    There is diffuse cerebral volume loss with prominence of the sulci,   fissures, and cisternal spaces which is normal for the patient's age.   There is minimal periventricular white matter hypoattenuation   statistically compatible with microvascular changes given calcific   atherosclerotic disease of the intracranial arteries.    The paranasal sinuses and mastoid air cells are clear. The calvarium   appears intact. The orbits appear unremarkable.    IMPRESSION: No acute intracranial hemorrhage.    1.4 cm hyperdense appearing left-sided intraventricular mass inseparable   from the choroid plexus, as discussed. Recommend further evaluation with   a contrast-enhanced MRI examination, if there are no clinical   contraindications.        Assessment--  83 year old female with no significant medical hx admitted with fall from home and noted ot have left hip fracture . She had low grade fever likely from fracture site hematoma . She is to undergo IM nailing left hip fracture .    Doubt any infectious etiology as her CXR and UA are negative  Her post op fever may be related to alelectasis    Post op anemia related to fx surgery .    She is also confused post op ? post anaesthetic effect . 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    Plan :   - incentive spirometry   - DC ochoa in am and TOV, she has been on Flomax since 12/21/18   - observe off antibiotics     - trend CBC  - PT evaluation   - dc planning     Continue with present regime .  Appropriate use of antibiotics and adverse effects reviewed.    I have discussed the above plan of care with patient's family in detail. They expressed understanding of the treatment plan . Risks, benefits and alternatives discussed in detail. I have asked if they have any questions or concerns and appropriately addressed them to the best of my ability .      > 35 minutes spent in direct patient care reviewing  the notes, lab data/ imaging , discussion with multidisciplinary team. All questions were addressed and answered to the best of my capacity .    Thank you for allowing me to participate in the care of your patient .        Harry Huerta MD  [phone]

## 2018-12-24 NOTE — PROVIDER CONTACT NOTE (CHANGE IN STATUS NOTIFICATION) - ASSESSMENT
Patient is an 84yo female presenting with a stage 1 pressure injury on her left heel 3 x 3 dilma-wound blanchable erythema

## 2018-12-25 LAB
AMMONIA BLD-MCNC: 25 UMOL/L — SIGNIFICANT CHANGE UP (ref 11–32)
ANION GAP SERPL CALC-SCNC: 6 MMOL/L — SIGNIFICANT CHANGE UP (ref 5–17)
BASOPHILS # BLD AUTO: 0.01 K/UL — SIGNIFICANT CHANGE UP (ref 0–0.2)
BASOPHILS NFR BLD AUTO: 0.1 % — SIGNIFICANT CHANGE UP (ref 0–2)
BUN SERPL-MCNC: 31 MG/DL — HIGH (ref 7–23)
CALCIUM SERPL-MCNC: 7.8 MG/DL — LOW (ref 8.5–10.1)
CHLORIDE SERPL-SCNC: 107 MMOL/L — SIGNIFICANT CHANGE UP (ref 96–108)
CO2 SERPL-SCNC: 29 MMOL/L — SIGNIFICANT CHANGE UP (ref 22–31)
CREAT SERPL-MCNC: 0.79 MG/DL — SIGNIFICANT CHANGE UP (ref 0.5–1.3)
CULTURE RESULTS: SIGNIFICANT CHANGE UP
CULTURE RESULTS: SIGNIFICANT CHANGE UP
EOSINOPHIL # BLD AUTO: 0.12 K/UL — SIGNIFICANT CHANGE UP (ref 0–0.5)
EOSINOPHIL NFR BLD AUTO: 1.5 % — SIGNIFICANT CHANGE UP (ref 0–6)
GLUCOSE SERPL-MCNC: 104 MG/DL — HIGH (ref 70–99)
HCT VFR BLD CALC: 28 % — LOW (ref 34.5–45)
HGB BLD-MCNC: 9.4 G/DL — LOW (ref 11.5–15.5)
IMM GRANULOCYTES NFR BLD AUTO: 1.1 % — SIGNIFICANT CHANGE UP (ref 0–1.5)
LYMPHOCYTES # BLD AUTO: 1.18 K/UL — SIGNIFICANT CHANGE UP (ref 1–3.3)
LYMPHOCYTES # BLD AUTO: 14.6 % — SIGNIFICANT CHANGE UP (ref 13–44)
MAGNESIUM SERPL-MCNC: 1.9 MG/DL — SIGNIFICANT CHANGE UP (ref 1.6–2.6)
MCHC RBC-ENTMCNC: 28.9 PG — SIGNIFICANT CHANGE UP (ref 27–34)
MCHC RBC-ENTMCNC: 33.6 GM/DL — SIGNIFICANT CHANGE UP (ref 32–36)
MCV RBC AUTO: 86.2 FL — SIGNIFICANT CHANGE UP (ref 80–100)
MONOCYTES # BLD AUTO: 0.78 K/UL — SIGNIFICANT CHANGE UP (ref 0–0.9)
MONOCYTES NFR BLD AUTO: 9.7 % — SIGNIFICANT CHANGE UP (ref 2–14)
NEUTROPHILS # BLD AUTO: 5.9 K/UL — SIGNIFICANT CHANGE UP (ref 1.8–7.4)
NEUTROPHILS NFR BLD AUTO: 73 % — SIGNIFICANT CHANGE UP (ref 43–77)
NRBC # BLD: 0 /100 WBCS — SIGNIFICANT CHANGE UP (ref 0–0)
PLATELET # BLD AUTO: 250 K/UL — SIGNIFICANT CHANGE UP (ref 150–400)
POTASSIUM SERPL-MCNC: 4.2 MMOL/L — SIGNIFICANT CHANGE UP (ref 3.5–5.3)
POTASSIUM SERPL-SCNC: 4.2 MMOL/L — SIGNIFICANT CHANGE UP (ref 3.5–5.3)
RBC # BLD: 3.25 M/UL — LOW (ref 3.8–5.2)
RBC # FLD: 13.5 % — SIGNIFICANT CHANGE UP (ref 10.3–14.5)
SODIUM SERPL-SCNC: 142 MMOL/L — SIGNIFICANT CHANGE UP (ref 135–145)
SPECIMEN SOURCE: SIGNIFICANT CHANGE UP
SPECIMEN SOURCE: SIGNIFICANT CHANGE UP
WBC # BLD: 8.08 K/UL — SIGNIFICANT CHANGE UP (ref 3.8–10.5)
WBC # FLD AUTO: 8.08 K/UL — SIGNIFICANT CHANGE UP (ref 3.8–10.5)

## 2018-12-25 RX ORDER — POLYETHYLENE GLYCOL 3350 17 G/17G
17 POWDER, FOR SOLUTION ORAL
Qty: 0 | Refills: 0 | COMMUNITY
Start: 2018-12-25

## 2018-12-25 RX ORDER — MAGNESIUM HYDROXIDE 400 MG/1
30 TABLET, CHEWABLE ORAL
Qty: 0 | Refills: 0 | COMMUNITY
Start: 2018-12-25

## 2018-12-25 RX ORDER — TAMSULOSIN HYDROCHLORIDE 0.4 MG/1
1 CAPSULE ORAL
Qty: 0 | Refills: 0 | COMMUNITY
Start: 2018-12-25

## 2018-12-25 RX ADMIN — Medication 100 MILLIGRAM(S): at 21:11

## 2018-12-25 RX ADMIN — Medication 325 MILLIGRAM(S): at 10:23

## 2018-12-25 RX ADMIN — ENOXAPARIN SODIUM 40 MILLIGRAM(S): 100 INJECTION SUBCUTANEOUS at 10:23

## 2018-12-25 RX ADMIN — LACTULOSE 20 GRAM(S): 10 SOLUTION ORAL at 05:24

## 2018-12-25 RX ADMIN — Medication 500 MILLIGRAM(S): at 05:24

## 2018-12-25 RX ADMIN — MAGNESIUM OXIDE 400 MG ORAL TABLET 400 MILLIGRAM(S): 241.3 TABLET ORAL at 11:11

## 2018-12-25 RX ADMIN — CARBIDOPA AND LEVODOPA 1 TABLET(S): 25; 100 TABLET ORAL at 05:24

## 2018-12-25 RX ADMIN — TAMSULOSIN HYDROCHLORIDE 0.4 MILLIGRAM(S): 0.4 CAPSULE ORAL at 21:11

## 2018-12-25 RX ADMIN — Medication 325 MILLIGRAM(S): at 17:10

## 2018-12-25 RX ADMIN — Medication 325 MILLIGRAM(S): at 11:12

## 2018-12-25 RX ADMIN — CARBIDOPA AND LEVODOPA 1 TABLET(S): 25; 100 TABLET ORAL at 13:05

## 2018-12-25 RX ADMIN — Medication 650 MILLIGRAM(S): at 21:16

## 2018-12-25 RX ADMIN — Medication 1 TABLET(S): at 11:11

## 2018-12-25 RX ADMIN — Medication 100 MILLIGRAM(S): at 05:24

## 2018-12-25 RX ADMIN — Medication 650 MILLIGRAM(S): at 21:55

## 2018-12-25 RX ADMIN — Medication 1 MILLIGRAM(S): at 11:11

## 2018-12-25 RX ADMIN — Medication 500 MILLIGRAM(S): at 17:10

## 2018-12-25 RX ADMIN — CARBIDOPA AND LEVODOPA 1 TABLET(S): 25; 100 TABLET ORAL at 21:11

## 2018-12-25 NOTE — PROGRESS NOTE ADULT - SUBJECTIVE AND OBJECTIVE BOX
Patient is a 83y old  Female who presents with a chief complaint of Left hip fracture (24 Dec 2018 15:56)      INTERVAL HPI/OVERNIGHT EVENTS: Patient seen and examined. NAD. No complaints. As per family patient eating well and participating in PT -- almost back to baseline.    Vital Signs Last 24 Hrs  T(C): 37.6 (25 Dec 2018 08:37), Max: 37.6 (25 Dec 2018 00:31)  T(F): 99.6 (25 Dec 2018 08:37), Max: 99.6 (25 Dec 2018 00:31)  HR: 75 (25 Dec 2018 08:37) (75 - 84)  BP: 139/74 (25 Dec 2018 08:37) (104/64 - 139/74)  BP(mean): --  RR: 17 (25 Dec 2018 08:37) (16 - 17)  SpO2: 99% (25 Dec 2018 08:37) (98% - 99%)    12-25    142  |  107  |  31<H>  ----------------------------<  104<H>  4.2   |  29  |  0.79    Ca    7.8<L>      25 Dec 2018 06:10  Mg     1.9     12-25    TPro  5.1<L>  /  Alb  2.1<L>  /  TBili  1.0  /  DBili  .20  /  AST  29  /  ALT  <6<L>  /  AlkPhos  22<L>  12-24                          9.4    8.08  )-----------( 250      ( 25 Dec 2018 06:10 )             28.0       CAPILLARY BLOOD GLUCOSE                  acetaminophen   Tablet .. 650 milliGRAM(s) Oral every 6 hours PRN  ascorbic acid 500 milliGRAM(s) Oral two times a day  carbidopa/levodopa  25/100 1 Tablet(s) Oral three times a day  diphenhydrAMINE 25 milliGRAM(s) Oral at bedtime PRN  docusate sodium 100 milliGRAM(s) Oral three times a day  enoxaparin Injectable 40 milliGRAM(s) SubCutaneous every 24 hours  ferrous    sulfate 325 milliGRAM(s) Oral three times a day with meals  folic acid 1 milliGRAM(s) Oral daily  HYDROmorphone  Injectable 0.5 milliGRAM(s) IV Push every 6 hours PRN  lactulose Syrup 20 Gram(s) Oral two times a day  magnesium hydroxide Suspension 30 milliLiter(s) Oral daily PRN  magnesium oxide 400 milliGRAM(s) Oral daily  multivitamin 1 Tablet(s) Oral daily  ondansetron Injectable 4 milliGRAM(s) IV Push every 6 hours PRN  oxyCODONE    IR 10 milliGRAM(s) Oral every 4 hours PRN  oxyCODONE    IR 5 milliGRAM(s) Oral every 4 hours PRN  polyethylene glycol 3350 17 Gram(s) Oral daily  senna 2 Tablet(s) Oral at bedtime  sodium chloride 0.9%. 1000 milliLiter(s) IV Continuous <Continuous>  tamsulosin 0.4 milliGRAM(s) Oral at bedtime              REVIEW OF SYSTEMS:  CONSTITUTIONAL: No fever, no weight loss, or no fatigue  NECK: No pain, no stiffness  RESPIRATORY: No cough, no wheezing, no chills, no hemoptysis, No shortness of breath  CARDIOVASCULAR: No chest pain, no palpitations, no dizziness, no leg swelling  GASTROINTESTINAL: No abdominal pain. No nausea, no vomiting, no hematemesis; No diarrhea, no constipation. No melena, no hematochezia.  GENITOURINARY: No dysuria, no frequency, no hematuria, no incontinence  NEUROLOGICAL: No headaches, no loss of strength, no numbness, no tremors  SKIN: No itching, no burning  MUSCULOSKELETAL: No joint pain, no swelling; No muscle, no back, no extremity pain  PSYCHIATRIC: No depression, no mood swings,   HEME/LYMPH: No easy bruising, no bleeding gums  ALLERY AND IMMUNOLOGIC: No hives       Consultant(s) Notes Reviewed:  [X] YES  [ ] NO    PHYSICAL EXAM:  GENERAL: NAD  HEAD:  Atraumatic, Normocephalic  EYES: EOMI, PERRLA, conjunctiva and sclera clear  ENMT: No tonsillar erythema, exudates, or enlargement; Moist mucous membranes  NECK: Supple, No JVD  NERVOUS SYSTEM:  Awake & alert  CHEST/LUNG: Clear to auscultation bilaterally; No rales, rhonchi, wheezing,  HEART: Regular rate and rhythm  ABDOMEN: Soft, Nontender, Nondistended; Bowel sounds present  EXTREMITIES:  No clubbing, cyanosis, or edema  LYMPH: No lymphadenopathy noted  SKIN: No rashes      Advanced care planning discussed with patient/family [X] YES   [ ] NO    Advanced care planning discussed with patient/family. Advanced care planning forms reviewed/discussed/completed. 20 minutes spent.

## 2018-12-25 NOTE — PROGRESS NOTE ADULT - SUBJECTIVE AND OBJECTIVE BOX
Neurology Follow up note    SIMONE [id number]    HPI:  SIMONE RIVERA is an 82 YO Female brought to ER because of left hip pain after a fall at home.  Patient was unable to get up from floor after a fall due to pain in left hip.  No LOC patient tripped and fall onto left side at home. No head injury, headache, neck or back pain. (19 Dec 2018 22:09)      Interval History - doing well .    Patient is seen, chart was reviewed and case was discussed with the treatment team.  Pt is not in any distress.   Lying on bed comfortably.   No events reported overnight.   No clinical seizure was reported.  Sitting on chair bed comfortably.    is at bedside.    Vital Signs Last 24 Hrs  T(C): 37.6 (25 Dec 2018 08:37), Max: 37.6 (25 Dec 2018 00:31)  T(F): 99.6 (25 Dec 2018 08:37), Max: 99.6 (25 Dec 2018 00:31)  HR: 75 (25 Dec 2018 08:37) (75 - 84)  BP: 139/74 (25 Dec 2018 08:37) (104/64 - 139/74)  BP(mean): --  RR: 17 (25 Dec 2018 08:37) (16 - 17)  SpO2: 99% (25 Dec 2018 08:37) (98% - 99%)        REVIEW OF SYSTEMS:    Constitutional: No fever, weight loss or fatigue  Eyes: No eye pain, visual disturbances, or discharge  ENT:  No difficulty hearing, tinnitus, vertigo; No sinus or throat pain  Neck: No pain or stiffness  Respiratory: No cough, wheezing, chills or hemoptysis  Cardiovascular: No chest pain, palpitations, shortness of breath, dizziness or leg swelling  Gastrointestinal: No abdominal or epigastric pain. No nausea, vomiting or hematemesis; No diarrhea or constipation. No melena or hematochezia.  Genitourinary: No dysuria, frequency, hematuria or incontinence  Neurological: No headaches, memory loss, loss of strength, numbness or tremors  Psychiatric: No depression,   Skin: No itching, burning, rashes or lesions   Lymph Nodes: No enlarged glands  Endocrine: No heat or cold intolerance; No hair loss, No h/o diabetes or thyroid dysfunction  Allergy and Immunologic: No hives or eczema    On Neurological Examination:    Mental Status - Pt is alert, awake, oriented X2  Follows commands well and able to answer questions appropriately.  Mood and affect  normal    Speech -  Normal.    Cranial Nerves - Pupils 3 mm equal and reactive to light, extraocular eye movements intact.  Pt has no facial asymmetry. Facial sensation is intact  .Tongue - is in midline.    Muscle tone - is normal      Motor Exam - 5/5 of UE.  RLE 4/5 LLE 3/5; LIMITED DUE TO PAIN.    Sensory Exam  Pt withdraws all extremities equally on stimulation. No asymmetry seen.     coordination    Finger to nose: normal      Deep tendon Reflexes - 2 plus all over.          Neck Supple -  Yes.     MEDICATIONS    acetaminophen   Tablet .. 650 milliGRAM(s) Oral every 6 hours PRN  ascorbic acid 500 milliGRAM(s) Oral two times a day  carbidopa/levodopa  25/100 1 Tablet(s) Oral three times a day  diphenhydrAMINE 25 milliGRAM(s) Oral at bedtime PRN  docusate sodium 100 milliGRAM(s) Oral three times a day  enoxaparin Injectable 40 milliGRAM(s) SubCutaneous every 24 hours  ferrous    sulfate 325 milliGRAM(s) Oral three times a day with meals  folic acid 1 milliGRAM(s) Oral daily  HYDROmorphone  Injectable 0.5 milliGRAM(s) IV Push every 6 hours PRN  magnesium hydroxide Suspension 30 milliLiter(s) Oral daily PRN  magnesium oxide 400 milliGRAM(s) Oral daily  multivitamin 1 Tablet(s) Oral daily  ondansetron Injectable 4 milliGRAM(s) IV Push every 6 hours PRN  oxyCODONE    IR 10 milliGRAM(s) Oral every 4 hours PRN  oxyCODONE    IR 5 milliGRAM(s) Oral every 4 hours PRN  polyethylene glycol 3350 17 Gram(s) Oral daily  sodium chloride 0.9%. 1000 milliLiter(s) IV Continuous <Continuous>  tamsulosin 0.4 milliGRAM(s) Oral at bedtime      Allergies    No Known Allergies    Intolerances        LABS:  CBC Full  -  ( 25 Dec 2018 06:10 )  WBC Count : 8.08 K/uL  Hemoglobin : 9.4 g/dL  Hematocrit : 28.0 %  Platelet Count - Automated : 250 K/uL  Mean Cell Volume : 86.2 fl  Mean Cell Hemoglobin : 28.9 pg  Mean Cell Hemoglobin Concentration : 33.6 gm/dL  Auto Neutrophil # : 5.90 K/uL  Auto Lymphocyte # : 1.18 K/uL  Auto Monocyte # : 0.78 K/uL  Auto Eosinophil # : 0.12 K/uL        12-25    142  |  107  |  31<H>  ----------------------------<  104<H>  4.2   |  29  |  0.79    Ca    7.8<L>      25 Dec 2018 06:10  Mg     1.9     12-25    TPro  5.1<L>  /  Alb  2.1<L>  /  TBili  1.0  /  DBili  .20  /  AST  29  /  ALT  <6<L>  /  AlkPhos  22<L>  12-24    Hemoglobin A1C:     Vitamin B12     RADIOLOGY    ASSESSMENT AND PLAN:       ENCEPHALOPATHY IMPROVING  LIKELY RELATED TO FEVER/ANESTHETIC AGENT  SP LEFT HIP ORIF.  PARKINSONISM.    CONTINUE SINEMET.  Physical therapy evaluation.  OOB to chair/ambulation with assistance only.  Pain is accessed and addressed.  Plan of care was discussed with family. Questions answered.  Would continue to follow.

## 2018-12-25 NOTE — PROGRESS NOTE ADULT - PROBLEM SELECTOR PLAN 1
likely multifactorial  had an anemia as out patient as per pt family and pt pcp, did not had a work up per pt/family/pcp  s/p lt hip surgery  s/p prbc 12/21  iron profile 12/22/18 (partly affected from prbc 12/21)--low iron/transferrin saturation, ferritin is an acute phase reactant and could be artifactually elevated in this patient  s/p iv iron for 3 days, patient is on po iron supplement as per pcp  hb at 9.4--low but stable  monitor serial h/h--transfuse prbc as needed for symptomatic anemia or if hb is under 7  pt daughter refuses ct scan/radiology scan/bm bx/ any further work up.  I d/w estelita montejo at bedside 12/23--who is in agreement of above and refuses any extensive work up(for anemia and skight weight loss), aware concern is a malignancy and with family refusal patient may die/become disabled from undiagnosed/untreated pathology/cancer.

## 2018-12-25 NOTE — PROGRESS NOTE ADULT - SUBJECTIVE AND OBJECTIVE BOX
ID Progress note     Name: SIMONE RIVERA  Age: 83y  Gender: Female  MRN: [id number]    Interval History-- Events noted, doing better, afebrile , oob to chair feeding herself   Notes reviewed    Past Medical History--  No pertinent past medical history      For details regarding the patient's social history, family history, and other miscellaneous elements, please refer the initial infectious diseases consultation and/or the admitting history and physical examination for this admission.    Allergies--  Allergies    No Known Allergies    Intolerances        Medications--  Antibiotics:    Immunologic:    Other:  acetaminophen   Tablet .. PRN  ascorbic acid  carbidopa/levodopa  25/100  diphenhydrAMINE PRN  docusate sodium  enoxaparin Injectable  ferrous    sulfate  folic acid  HYDROmorphone  Injectable PRN  magnesium hydroxide Suspension PRN  magnesium oxide  multivitamin  ondansetron Injectable PRN  oxyCODONE    IR PRN  oxyCODONE    IR PRN  polyethylene glycol 3350  sodium chloride 0.9%.  tamsulosin      Review of Systems--  Review of systems unable to be obtained secondary to clinical condition.    Physical Examination--    Vital Signs: T(F): 99.6 (12-25-18 @ 08:37), Max: 99.6 (12-25-18 @ 00:31)  HR: 75 (12-25-18 @ 08:37)  BP: 139/74 (12-25-18 @ 08:37)  RR: 17 (12-25-18 @ 08:37)  SpO2: 99% (12-25-18 @ 08:37)  Wt(kg): --  General: Nontoxic-appearing Female in no acute distress lethargic but arouse able   HEENT: AT/NC. PERRL. EOMI. Anicteric. Conjunctiva pink and moist. Oropharynx clear. Dentition fair.  Neck: Not rigid. No sense of mass.  Nodes: None palpable.  Lungs: Clear bilaterally without rales, wheezing or rhonchi  Heart: Regular rate and rhythm. No Murmur. No rub. No gallop. No palpable thrill.  Abdomen: Bowel sounds present and normoactive. Soft. Nondistended. Nontender. No sense of mass. No organomegaly.  Back: No spinal tenderness. No costovertebral angle tenderness.   Extremities: No cyanosis or clubbing. No edema.   Skin: Warm. Dry. Good turgor. No rash. No vasculitic stigmata.  Psychiatric: Appropriate affect and mood for situation.       Laboratory Studies--  CBC                        9.4    8.08  )-----------( 250      ( 25 Dec 2018 06:10 )             28.0       Chemistries  12-25    142  |  107  |  31<H>  ----------------------------<  104<H>  4.2   |  29  |  0.79    Ca    7.8<L>      25 Dec 2018 06:10  Mg     1.9     12-25    TPro  5.1<L>  /  Alb  2.1<L>  /  TBili  1.0  /  DBili  .20  /  AST  29  /  ALT  <6<L>  /  AlkPhos  22<L>  12-24      Culture Data    Culture - Urine (collected 21 Dec 2018 22:48)  Source: .Urine Catheterized  Final Report (22 Dec 2018 22:45):    No growth    Culture - Blood (collected 20 Dec 2018 12:59)  Source: .Blood Blood  Preliminary Report (21 Dec 2018 13:01):    No growth to date.    Culture - Blood (collected 20 Dec 2018 12:59)  Source: .Blood Blood  Preliminary Report (21 Dec 2018 13:01):    No growth to date.      Xray Chest 1 View- PORTABLE-Routine (12.23.18 @ 16:25) >  FINDINGS: Heart size appears borderline enlarged. No superior mediastinal   abnormalities are identified. Hilar and right paratracheal calcifications   likely related to calcified lymph nodes. There is no evidence for focal   infiltrate, lobar consolidation or pulmonary edema. No pleural effusion   or pneumothorax is demonstrated. No mediastinal shift is noted. No   osseous fractures evident.    IMPRESSION: No evidence for focal infiltrate or lobar consolidation.         CT Head No Cont (12.22.18 @ 15:45) >  FINDINGS: There is a 1.4 x 1.1 x 1.3 cm (AP x TRV x CC) hyperdense   appearing left-sided intraventricular mass which is inseparable from the   left choroid plexus in the lateral ventricular body with extension to the   ventricular trigone. There is no associated mass effect or shift of   midline structures. There is no hydrocephalus.    There is no acute intracranial hemorrhage or abnormal extra axial fluid   collection.    There is diffuse cerebral volume loss with prominence of the sulci,   fissures, and cisternal spaces which is normal for the patient's age.   There is minimal periventricular white matter hypoattenuation   statistically compatible with microvascular changes given calcific   atherosclerotic disease of the intracranial arteries.    The paranasal sinuses and mastoid air cells are clear. The calvarium   appears intact. The orbits appear unremarkable.    IMPRESSION: No acute intracranial hemorrhage.    1.4 cm hyperdense appearing left-sided intraventricular mass inseparable   from the choroid plexus, as discussed. Recommend further evaluation with   a contrast-enhanced MRI examination, if there are no clinical   contraindications.        Assessment--  83 year old female with no significant medical hx admitted with fall from home and noted ot have left hip fracture . She had low grade fever likely from fracture site hematoma . She is to undergo IM nailing left hip fracture .    Doubt any infectious etiology as her CXR and UA are negative  Her post op fever may be related to alelectasis    Post op anemia related to fx surgery .    She is also confused post op ? post anaesthetic effect . 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    [place] dc this an, has not voided yet     Plan :   - TOV today   - incentive spirometry   -  observe off antibiotics     - trend CBC  - PT evaluation   - dc planning     Continue with present regime .  Appropriate use of antibiotics and adverse effects reviewed.    I have discussed the above plan of care with patient's family in detail. They expressed understanding of the treatment plan . Risks, benefits and alternatives discussed in detail. I have asked if they have any questions or concerns and appropriately addressed them to the best of my ability .      > 35 minutes spent in direct patient care reviewing  the notes, lab data/ imaging , discussion with multidisciplinary team. All questions were addressed and answered to the best of my capacity .    Thank you for allowing me to participate in the care of your patient .        Harry Huerta MD  [phone]

## 2018-12-25 NOTE — PROGRESS NOTE ADULT - ASSESSMENT
Dr Nestor Blanton  [phone], [facility],, case d/w dr blanton and as per dr blanton out patient hb was 10.6 in 12/2018  83/f was admitted after fall and had left hip fx, underwent lt hip surgery 12/20/18. Hematology was consulted for anemia, patient received 2 units prbc on 12/21/18  patient is with hx of anemia as out patient, as per daughter ray and as per dr blanton--patient never had hematology evaluation  patient with some weight loss about 9 pounds in last 3 months--as i d/w daughter and dr blanton. per dr blanton patient/family had refused work up for weight loss with him. Daughter thinks weight loss as she is not eating well lately.  i d/w daughter ray on 12/21 at consult as well as on 12/22/2018--advised further work up including ct scan c/a/p , gi evaluation and gi work up--pt daughter ray refuses again any further work up for weight loss. i expressed my concern for malignancy, considering her age and weight loss. i stressed for further work up. family/daughter refuses any further work up for weight loss with clear understanding potential serious but treatable cancer/pathology/malignancy may remain undiagnosed/untreated and she may die/become disabled from undiagnosed/untreated malignancy/gi pathology/cancer  patient daughter also refuses bm asp and bx to definitively exclude hematology pathology/mds/leukemia/any other pathology  12/23/18--neurology/pcp are following, had ct head and for intracranial mass--pt son prefer further work up and plan as per and with neurology, refuses ct body

## 2018-12-25 NOTE — PROGRESS NOTE ADULT - SUBJECTIVE AND OBJECTIVE BOX
Patient is a 83y old  Female who presents with a chief complaint of Left hip fracture (25 Dec 2018 09:13)       Pt is seen and examined  pt is awake and lying in bed/out of bed to chair  pt seems comfortable and denies any complaints at this time    HPI:  SIMONE RIVERA is an 84 YO Female brought to ER because of left hip pain after a fall at home.  Patient was unable to get up from floor after a fall due to pain in left hip.  No LOC patient tripped and fall onto left side at home. No head injury, headache, neck or back pain. (19 Dec 2018 22:09)         ROS:  Negative except for:    MEDICATIONS  (STANDING):  ascorbic acid 500 milliGRAM(s) Oral two times a day  carbidopa/levodopa  25/100 1 Tablet(s) Oral three times a day  docusate sodium 100 milliGRAM(s) Oral three times a day  enoxaparin Injectable 40 milliGRAM(s) SubCutaneous every 24 hours  ferrous    sulfate 325 milliGRAM(s) Oral three times a day with meals  folic acid 1 milliGRAM(s) Oral daily  magnesium oxide 400 milliGRAM(s) Oral daily  multivitamin 1 Tablet(s) Oral daily  polyethylene glycol 3350 17 Gram(s) Oral daily  sodium chloride 0.9%. 1000 milliLiter(s) (50 mL/Hr) IV Continuous <Continuous>  tamsulosin 0.4 milliGRAM(s) Oral at bedtime    MEDICATIONS  (PRN):  acetaminophen   Tablet .. 650 milliGRAM(s) Oral every 6 hours PRN Temp greater or equal to 38C (100.4F), Mild Pain (1 - 3)  diphenhydrAMINE 25 milliGRAM(s) Oral at bedtime PRN Insomnia  HYDROmorphone  Injectable 0.5 milliGRAM(s) IV Push every 6 hours PRN breakthrough pain  magnesium hydroxide Suspension 30 milliLiter(s) Oral daily PRN Constipation  ondansetron Injectable 4 milliGRAM(s) IV Push every 6 hours PRN Nausea and/or Vomiting  oxyCODONE    IR 10 milliGRAM(s) Oral every 4 hours PRN Severe Pain (7 - 10)  oxyCODONE    IR 5 milliGRAM(s) Oral every 4 hours PRN Moderate Pain (4 - 6)      Allergies    No Known Allergies    Intolerances        Vital Signs Last 24 Hrs  T(C): 37.6 (25 Dec 2018 08:37), Max: 37.6 (25 Dec 2018 00:31)  T(F): 99.6 (25 Dec 2018 08:37), Max: 99.6 (25 Dec 2018 00:31)  HR: 75 (25 Dec 2018 08:37) (75 - 84)  BP: 139/74 (25 Dec 2018 08:37) (104/64 - 139/74)  BP(mean): --  RR: 17 (25 Dec 2018 08:37) (16 - 17)  SpO2: 99% (25 Dec 2018 08:37) (98% - 99%)    PHYSICAL EXAM  General: adult in NAD  HEENT: clear oropharynx, anicteric sclera, pink conjunctiva  Neck: supple  CV: normal S1/S2 with no murmur rubs or gallops  Lungs: positive air movement b/l ant lungs,clear to auscultation, no wheezes, no rales  Abdomen: soft non-tender non-distended, no hepatosplenomegaly  Ext: no clubbing cyanosis or edema  Skin: no rashes and no petechiae  Neuro: alert and oriented X 4, no focal deficits  LABS:                          9.4    8.08  )-----------( 250      ( 25 Dec 2018 06:10 )             28.0         Mean Cell Volume : 86.2 fl  Mean Cell Hemoglobin : 28.9 pg  Mean Cell Hemoglobin Concentration : 33.6 gm/dL  Auto Neutrophil # : 5.90 K/uL  Auto Lymphocyte # : 1.18 K/uL  Auto Monocyte # : 0.78 K/uL  Auto Eosinophil # : 0.12 K/uL  Auto Basophil # : 0.01 K/uL  Auto Neutrophil % : 73.0 %  Auto Lymphocyte % : 14.6 %  Auto Monocyte % : 9.7 %  Auto Eosinophil % : 1.5 %  Auto Basophil % : 0.1 %    Serial CBC's  12-25 @ 06:10  Hct-28.0 / Hgb-9.4 / Plat-250 / RBC-3.25 / WBC-8.08          Serial CBC's  12-24 @ 09:19  Hct-27.4 / Hgb-9.1 / Plat-214 / RBC-3.19 / WBC-6.44          Serial CBC's  12-23 @ 06:16  Hct-27.2 / Hgb-9.2 / Plat-196 / RBC-3.19 / WBC-8.89          Serial CBC's  12-22 @ 08:19  Hct-26.0 / Hgb-9.0 / Plat-159 / RBC-3.11 / WBC-9.11          Serial CBC's  12-21 @ 20:49  Hct-27.2 / Hgb-9.5 / Plat--- / RBC--- / WBC---            12-25    142  |  107  |  31<H>  ----------------------------<  104<H>  4.2   |  29  |  0.79    Ca    7.8<L>      25 Dec 2018 06:10  Mg     1.9     12-25    TPro  5.1<L>  /  Alb  2.1<L>  /  TBili  1.0  /  DBili  .20  /  AST  29  /  ALT  <6<L>  /  AlkPhos  22<L>  12-24          Iron - Total Binding Capacity.: 212 ug/dL (12-22-18 @ 09:57)  Ferritin, Serum: 216 ng/mL (12-22-18 @ 09:56)  Vitamin B12, Serum: 541 pg/mL (12-22-18 @ 09:56)  Folate, Serum: >20.0 ng/mL (12-22-18 @ 09:56)  Reticulocyte Percent: 1.6 % (12-22-18 @ 08:19)                BLOOD SMEAR INTERPRETATION:       RADIOLOGY & ADDITIONAL STUDIES: Patient is a 83y old  Female who presents with a chief complaint of Left hip fracture (25 Dec 2018 09:13)       Pt is seen and examined  pt is awake and is sitting in chair, daughter/son/grandchildren are at bedside  pt seems comfortable and denies any complaints at this time    HPI:  SIMONE RIVERA is an 82 YO Female brought to ER because of left hip pain after a fall at home.  Patient was unable to get up from floor after a fall due to pain in left hip.  No LOC patient tripped and fall onto left side at home. No head injury, headache, neck or back pain. (19 Dec 2018 22:09)       MEDICATIONS  (STANDING):  ascorbic acid 500 milliGRAM(s) Oral two times a day  carbidopa/levodopa  25/100 1 Tablet(s) Oral three times a day  docusate sodium 100 milliGRAM(s) Oral three times a day  enoxaparin Injectable 40 milliGRAM(s) SubCutaneous every 24 hours  ferrous    sulfate 325 milliGRAM(s) Oral three times a day with meals  folic acid 1 milliGRAM(s) Oral daily  magnesium oxide 400 milliGRAM(s) Oral daily  multivitamin 1 Tablet(s) Oral daily  polyethylene glycol 3350 17 Gram(s) Oral daily  sodium chloride 0.9%. 1000 milliLiter(s) (50 mL/Hr) IV Continuous <Continuous>  tamsulosin 0.4 milliGRAM(s) Oral at bedtime    MEDICATIONS  (PRN):  acetaminophen   Tablet .. 650 milliGRAM(s) Oral every 6 hours PRN Temp greater or equal to 38C (100.4F), Mild Pain (1 - 3)  diphenhydrAMINE 25 milliGRAM(s) Oral at bedtime PRN Insomnia  HYDROmorphone  Injectable 0.5 milliGRAM(s) IV Push every 6 hours PRN breakthrough pain  magnesium hydroxide Suspension 30 milliLiter(s) Oral daily PRN Constipation  ondansetron Injectable 4 milliGRAM(s) IV Push every 6 hours PRN Nausea and/or Vomiting  oxyCODONE    IR 10 milliGRAM(s) Oral every 4 hours PRN Severe Pain (7 - 10)  oxyCODONE    IR 5 milliGRAM(s) Oral every 4 hours PRN Moderate Pain (4 - 6)      Allergies    No Known Allergies    Intolerances        Vital Signs Last 24 Hrs  T(C): 37.6 (25 Dec 2018 08:37), Max: 37.6 (25 Dec 2018 00:31)  T(F): 99.6 (25 Dec 2018 08:37), Max: 99.6 (25 Dec 2018 00:31)  HR: 75 (25 Dec 2018 08:37) (75 - 84)  BP: 139/74 (25 Dec 2018 08:37) (104/64 - 139/74)  BP(mean): --  RR: 17 (25 Dec 2018 08:37) (16 - 17)  SpO2: 99% (25 Dec 2018 08:37) (98% - 99%)    PHYSICAL EXAM  General: adult in NAD  HEENT: clear oropharynx, anicteric sclera, pink conjunctiva  Neck: supple  CV: normal S1/S2 with no murmur rubs or gallops  Lungs: positive air movement b/l ant lungs,clear to auscultation, no wheezes, no rales  Abdomen: soft non-tender non-distended, no hepatosplenomegaly  Ext: no clubbing cyanosis or edema  Skin: no rashes and no petechiae  Neuro: alert and oriented X 4, no focal deficits  LABS:                          9.4    8.08  )-----------( 250      ( 25 Dec 2018 06:10 )             28.0         Mean Cell Volume : 86.2 fl  Mean Cell Hemoglobin : 28.9 pg  Mean Cell Hemoglobin Concentration : 33.6 gm/dL  Auto Neutrophil # : 5.90 K/uL  Auto Lymphocyte # : 1.18 K/uL  Auto Monocyte # : 0.78 K/uL  Auto Eosinophil # : 0.12 K/uL  Auto Basophil # : 0.01 K/uL  Auto Neutrophil % : 73.0 %  Auto Lymphocyte % : 14.6 %  Auto Monocyte % : 9.7 %  Auto Eosinophil % : 1.5 %  Auto Basophil % : 0.1 %    Serial CBC's  12-25 @ 06:10  Hct-28.0 / Hgb-9.4 / Plat-250 / RBC-3.25 / WBC-8.08          Serial CBC's  12-24 @ 09:19  Hct-27.4 / Hgb-9.1 / Plat-214 / RBC-3.19 / WBC-6.44          Serial CBC's  12-23 @ 06:16  Hct-27.2 / Hgb-9.2 / Plat-196 / RBC-3.19 / WBC-8.89          Serial CBC's  12-22 @ 08:19  Hct-26.0 / Hgb-9.0 / Plat-159 / RBC-3.11 / WBC-9.11          Serial CBC's  12-21 @ 20:49  Hct-27.2 / Hgb-9.5 / Plat--- / RBC--- / WBC---            12-25    142  |  107  |  31<H>  ----------------------------<  104<H>  4.2   |  29  |  0.79    Ca    7.8<L>      25 Dec 2018 06:10  Mg     1.9     12-25    TPro  5.1<L>  /  Alb  2.1<L>  /  TBili  1.0  /  DBili  .20  /  AST  29  /  ALT  <6<L>  /  AlkPhos  22<L>  12-24          Iron - Total Binding Capacity.: 212 ug/dL (12-22-18 @ 09:57)  Ferritin, Serum: 216 ng/mL (12-22-18 @ 09:56)  Vitamin B12, Serum: 541 pg/mL (12-22-18 @ 09:56)  Folate, Serum: >20.0 ng/mL (12-22-18 @ 09:56)  Reticulocyte Percent: 1.6 % (12-22-18 @ 08:19)

## 2018-12-26 VITALS
SYSTOLIC BLOOD PRESSURE: 111 MMHG | DIASTOLIC BLOOD PRESSURE: 67 MMHG | TEMPERATURE: 97 F | RESPIRATION RATE: 17 BRPM | OXYGEN SATURATION: 98 % | HEART RATE: 75 BPM

## 2018-12-26 LAB
% ALBUMIN: 57.1 % — SIGNIFICANT CHANGE UP
% ALPHA 1: 8.5 % — SIGNIFICANT CHANGE UP
% ALPHA 2: 12.8 % — SIGNIFICANT CHANGE UP
% BETA: 10.6 % — SIGNIFICANT CHANGE UP
% GAMMA: 11 % — SIGNIFICANT CHANGE UP
ALBUMIN SERPL ELPH-MCNC: 2.7 G/DL — LOW (ref 3.6–5.5)
ALBUMIN/GLOB SERPL ELPH: 1.4 RATIO — SIGNIFICANT CHANGE UP
ALPHA1 GLOB SERPL ELPH-MCNC: 0.4 G/DL — SIGNIFICANT CHANGE UP (ref 0.1–0.4)
ALPHA2 GLOB SERPL ELPH-MCNC: 0.6 G/DL — SIGNIFICANT CHANGE UP (ref 0.5–1)
ANION GAP SERPL CALC-SCNC: 6 MMOL/L — SIGNIFICANT CHANGE UP (ref 5–17)
B-GLOBULIN SERPL ELPH-MCNC: 0.5 G/DL — SIGNIFICANT CHANGE UP (ref 0.5–1)
BUN SERPL-MCNC: 38 MG/DL — HIGH (ref 7–23)
CALCIUM SERPL-MCNC: 7.9 MG/DL — LOW (ref 8.5–10.1)
CHLORIDE SERPL-SCNC: 106 MMOL/L — SIGNIFICANT CHANGE UP (ref 96–108)
CO2 SERPL-SCNC: 28 MMOL/L — SIGNIFICANT CHANGE UP (ref 22–31)
CREAT SERPL-MCNC: 0.83 MG/DL — SIGNIFICANT CHANGE UP (ref 0.5–1.3)
GAMMA GLOBULIN: 0.5 G/DL — LOW (ref 0.6–1.6)
GLUCOSE SERPL-MCNC: 111 MG/DL — HIGH (ref 70–99)
MAGNESIUM SERPL-MCNC: 2 MG/DL — SIGNIFICANT CHANGE UP (ref 1.6–2.6)
POTASSIUM SERPL-MCNC: 4.1 MMOL/L — SIGNIFICANT CHANGE UP (ref 3.5–5.3)
POTASSIUM SERPL-SCNC: 4.1 MMOL/L — SIGNIFICANT CHANGE UP (ref 3.5–5.3)
PROT PATTERN SERPL ELPH-IMP: SIGNIFICANT CHANGE UP
SODIUM SERPL-SCNC: 140 MMOL/L — SIGNIFICANT CHANGE UP (ref 135–145)

## 2018-12-26 PROCEDURE — 97530 THERAPEUTIC ACTIVITIES: CPT

## 2018-12-26 PROCEDURE — 97166 OT EVAL MOD COMPLEX 45 MIN: CPT

## 2018-12-26 PROCEDURE — P9016: CPT

## 2018-12-26 PROCEDURE — 93005 ELECTROCARDIOGRAM TRACING: CPT

## 2018-12-26 PROCEDURE — 83615 LACTATE (LD) (LDH) ENZYME: CPT

## 2018-12-26 PROCEDURE — 74018 RADEX ABDOMEN 1 VIEW: CPT

## 2018-12-26 PROCEDURE — 71045 X-RAY EXAM CHEST 1 VIEW: CPT

## 2018-12-26 PROCEDURE — 80053 COMPREHEN METABOLIC PANEL: CPT

## 2018-12-26 PROCEDURE — 72170 X-RAY EXAM OF PELVIS: CPT

## 2018-12-26 PROCEDURE — 73562 X-RAY EXAM OF KNEE 3: CPT

## 2018-12-26 PROCEDURE — 84145 PROCALCITONIN (PCT): CPT

## 2018-12-26 PROCEDURE — 36430 TRANSFUSION BLD/BLD COMPNT: CPT

## 2018-12-26 PROCEDURE — 84165 PROTEIN E-PHORESIS SERUM: CPT

## 2018-12-26 PROCEDURE — 73502 X-RAY EXAM HIP UNI 2-3 VIEWS: CPT

## 2018-12-26 PROCEDURE — 97110 THERAPEUTIC EXERCISES: CPT

## 2018-12-26 PROCEDURE — 82746 ASSAY OF FOLIC ACID SERUM: CPT

## 2018-12-26 PROCEDURE — 84155 ASSAY OF PROTEIN SERUM: CPT

## 2018-12-26 PROCEDURE — 70450 CT HEAD/BRAIN W/O DYE: CPT

## 2018-12-26 PROCEDURE — 85014 HEMATOCRIT: CPT

## 2018-12-26 PROCEDURE — 76000 FLUOROSCOPY <1 HR PHYS/QHP: CPT

## 2018-12-26 PROCEDURE — 83010 ASSAY OF HAPTOGLOBIN QUANT: CPT

## 2018-12-26 PROCEDURE — 97161 PT EVAL LOW COMPLEX 20 MIN: CPT

## 2018-12-26 PROCEDURE — 85610 PROTHROMBIN TIME: CPT

## 2018-12-26 PROCEDURE — 97116 GAIT TRAINING THERAPY: CPT

## 2018-12-26 PROCEDURE — 87086 URINE CULTURE/COLONY COUNT: CPT

## 2018-12-26 PROCEDURE — 73552 X-RAY EXAM OF FEMUR 2/>: CPT

## 2018-12-26 PROCEDURE — 82962 GLUCOSE BLOOD TEST: CPT

## 2018-12-26 PROCEDURE — 84443 ASSAY THYROID STIM HORMONE: CPT

## 2018-12-26 PROCEDURE — 73610 X-RAY EXAM OF ANKLE: CPT

## 2018-12-26 PROCEDURE — 81001 URINALYSIS AUTO W/SCOPE: CPT

## 2018-12-26 PROCEDURE — 83036 HEMOGLOBIN GLYCOSYLATED A1C: CPT

## 2018-12-26 PROCEDURE — 82607 VITAMIN B-12: CPT

## 2018-12-26 PROCEDURE — 85027 COMPLETE CBC AUTOMATED: CPT

## 2018-12-26 PROCEDURE — 82728 ASSAY OF FERRITIN: CPT

## 2018-12-26 PROCEDURE — 86923 COMPATIBILITY TEST ELECTRIC: CPT

## 2018-12-26 PROCEDURE — 86334 IMMUNOFIX E-PHORESIS SERUM: CPT

## 2018-12-26 PROCEDURE — 85018 HEMOGLOBIN: CPT

## 2018-12-26 PROCEDURE — C1713: CPT

## 2018-12-26 PROCEDURE — 85045 AUTOMATED RETICULOCYTE COUNT: CPT

## 2018-12-26 PROCEDURE — 80048 BASIC METABOLIC PNL TOTAL CA: CPT

## 2018-12-26 PROCEDURE — 86900 BLOOD TYPING SEROLOGIC ABO: CPT

## 2018-12-26 PROCEDURE — 83540 ASSAY OF IRON: CPT

## 2018-12-26 PROCEDURE — 86901 BLOOD TYPING SEROLOGIC RH(D): CPT

## 2018-12-26 PROCEDURE — 85730 THROMBOPLASTIN TIME PARTIAL: CPT

## 2018-12-26 PROCEDURE — 83550 IRON BINDING TEST: CPT

## 2018-12-26 PROCEDURE — 97535 SELF CARE MNGMENT TRAINING: CPT

## 2018-12-26 PROCEDURE — 87040 BLOOD CULTURE FOR BACTERIA: CPT

## 2018-12-26 PROCEDURE — 86850 RBC ANTIBODY SCREEN: CPT

## 2018-12-26 PROCEDURE — 99285 EMERGENCY DEPT VISIT HI MDM: CPT | Mod: 25

## 2018-12-26 PROCEDURE — 82140 ASSAY OF AMMONIA: CPT

## 2018-12-26 PROCEDURE — 83735 ASSAY OF MAGNESIUM: CPT

## 2018-12-26 PROCEDURE — 83521 IG LIGHT CHAINS FREE EACH: CPT

## 2018-12-26 PROCEDURE — 80061 LIPID PANEL: CPT

## 2018-12-26 PROCEDURE — 87631 RESP VIRUS 3-5 TARGETS: CPT

## 2018-12-26 PROCEDURE — 80076 HEPATIC FUNCTION PANEL: CPT

## 2018-12-26 PROCEDURE — 36415 COLL VENOUS BLD VENIPUNCTURE: CPT

## 2018-12-26 RX ADMIN — ENOXAPARIN SODIUM 40 MILLIGRAM(S): 100 INJECTION SUBCUTANEOUS at 08:14

## 2018-12-26 RX ADMIN — Medication 325 MILLIGRAM(S): at 11:35

## 2018-12-26 RX ADMIN — MAGNESIUM OXIDE 400 MG ORAL TABLET 400 MILLIGRAM(S): 241.3 TABLET ORAL at 11:35

## 2018-12-26 RX ADMIN — Medication 100 MILLIGRAM(S): at 05:40

## 2018-12-26 RX ADMIN — Medication 325 MILLIGRAM(S): at 08:14

## 2018-12-26 RX ADMIN — POLYETHYLENE GLYCOL 3350 17 GRAM(S): 17 POWDER, FOR SOLUTION ORAL at 11:35

## 2018-12-26 RX ADMIN — Medication 500 MILLIGRAM(S): at 05:40

## 2018-12-26 RX ADMIN — CARBIDOPA AND LEVODOPA 1 TABLET(S): 25; 100 TABLET ORAL at 05:40

## 2018-12-26 RX ADMIN — Medication 1 TABLET(S): at 11:35

## 2018-12-26 RX ADMIN — Medication 1 MILLIGRAM(S): at 11:35

## 2018-12-26 NOTE — PROGRESS NOTE ADULT - SUBJECTIVE AND OBJECTIVE BOX
Patient is a 83y old  Female who presents with a chief complaint of Left hip fracture (25 Dec 2018 12:10)      INTERVAL HPI/OVERNIGHT EVENTS: Patient seen and examined. NAD. No complaints.    Vital Signs Last 24 Hrs  T(C): 36.3 (26 Dec 2018 07:13), Max: 37.7 (25 Dec 2018 21:18)  T(F): 97.3 (26 Dec 2018 07:13), Max: 99.9 (25 Dec 2018 21:18)  HR: 75 (26 Dec 2018 07:13) (71 - 88)  BP: 111/67 (26 Dec 2018 07:13) (111/67 - 133/68)  BP(mean): --  RR: 17 (26 Dec 2018 07:13) (17 - 18)  SpO2: 98% (26 Dec 2018 07:13) (98% - 100%)    12-26    140  |  106  |  38<H>  ----------------------------<  111<H>  4.1   |  28  |  0.83    Ca    7.9<L>      26 Dec 2018 08:23  Mg     2.0     12-26                            9.4    8.08  )-----------( 250      ( 25 Dec 2018 06:10 )             28.0       CAPILLARY BLOOD GLUCOSE                  acetaminophen   Tablet .. 650 milliGRAM(s) Oral every 6 hours PRN  ascorbic acid 500 milliGRAM(s) Oral two times a day  carbidopa/levodopa  25/100 1 Tablet(s) Oral three times a day  diphenhydrAMINE 25 milliGRAM(s) Oral at bedtime PRN  docusate sodium 100 milliGRAM(s) Oral three times a day  enoxaparin Injectable 40 milliGRAM(s) SubCutaneous every 24 hours  ferrous    sulfate 325 milliGRAM(s) Oral three times a day with meals  folic acid 1 milliGRAM(s) Oral daily  HYDROmorphone  Injectable 0.5 milliGRAM(s) IV Push every 6 hours PRN  magnesium hydroxide Suspension 30 milliLiter(s) Oral daily PRN  magnesium oxide 400 milliGRAM(s) Oral daily  multivitamin 1 Tablet(s) Oral daily  ondansetron Injectable 4 milliGRAM(s) IV Push every 6 hours PRN  oxyCODONE    IR 10 milliGRAM(s) Oral every 4 hours PRN  oxyCODONE    IR 5 milliGRAM(s) Oral every 4 hours PRN  polyethylene glycol 3350 17 Gram(s) Oral daily  tamsulosin 0.4 milliGRAM(s) Oral at bedtime              REVIEW OF SYSTEMS:  CONSTITUTIONAL: No fever, no weight loss, or no fatigue  NECK: No pain, no stiffness  RESPIRATORY: No cough, no wheezing, no chills, no hemoptysis, No shortness of breath  CARDIOVASCULAR: No chest pain, no palpitations, no dizziness, no leg swelling  GASTROINTESTINAL: No abdominal pain. No nausea, no vomiting, no hematemesis; No diarrhea, no constipation. No melena, no hematochezia.  GENITOURINARY: No dysuria, no frequency, no hematuria, no incontinence  NEUROLOGICAL: No headaches, no loss of strength, no numbness, no tremors  SKIN: No itching, no burning  MUSCULOSKELETAL: No joint pain, no swelling; No muscle, no back, no extremity pain  PSYCHIATRIC: No depression, no mood swings,   HEME/LYMPH: No easy bruising, no bleeding gums  ALLERY AND IMMUNOLOGIC: No hives       Consultant(s) Notes Reviewed:  [X] YES  [ ] NO    PHYSICAL EXAM:  GENERAL: NAD  HEAD:  Atraumatic, Normocephalic  EYES: EOMI, PERRLA, conjunctiva and sclera clear  ENMT: No tonsillar erythema, exudates, or enlargement; Moist mucous membranes  NECK: Supple, No JVD  NERVOUS SYSTEM:  Awake & alert  CHEST/LUNG: Clear to auscultation bilaterally; No rales, rhonchi, wheezing,  HEART: Regular rate and rhythm  ABDOMEN: Soft, Nontender, Nondistended; Bowel sounds present  EXTREMITIES:  No clubbing, cyanosis, or edema  LYMPH: No lymphadenopathy noted  SKIN: No rashes      Advanced care planning discussed with patient/family [X] YES   [ ] NO    Advanced care planning discussed with patient/family. Advanced care planning forms reviewed/discussed/completed. 20 minutes spent.

## 2018-12-26 NOTE — PROGRESS NOTE ADULT - PROBLEM SELECTOR PLAN 2
- multifactorial -- anesthesia effects, sundowning compounded by baseline MCI, fever -  - ammonia is slightly elevated -- will start lactulose  - in some instances severe constipation can cause delirium in the elderly -- there is fecal loading on AXR -- started on bowel regimen  - CT head results noted-- findings are old and stable  - Neuro f/u
- resolved  - multifactorial -- anesthesia effects, sundowning compounded by baseline MCI, fever -  - ammonia level normal now -- will d/c lactulose  - in some instances severe constipation can cause delirium in the elderly -- there is fecal loading on AXR -- started on bowel regimen  - CT head results noted-- findings are old and stable  - Neuro f/u
- resolved  - multifactorial -- anesthesia effects, sundowning compounded by baseline MCI, fever -  - ammonia level normal now -- will d/c lactulose  - in some instances severe constipation can cause delirium in the elderly -- there is fecal loading on AXR -- started on bowel regimen  - CT head results noted-- findings are old and stable  - Neuro f/u
fall precautions  neuro eval
multifactorial -- anesthesia effects, sundowning compounded by baseline MCI  Check CT head  Neuro f/u
multifactorial -- anesthesia effects, sundowning compounded by baseline MCI, fever  CT head results noted-- findings are old and stable  Neuro f/u
ortho is following  s/p surgery  gi/dvtp as per orthopedics
ortho is following  s/p surgery  gi/dvtp as per orthopedics.

## 2018-12-26 NOTE — PROGRESS NOTE ADULT - PROBLEM SELECTOR PROBLEM 1
Closed fracture of left hip, initial encounter
Anemia

## 2018-12-26 NOTE — PROGRESS NOTE ADULT - SUBJECTIVE AND OBJECTIVE BOX
CHIEF COMPLAINT/ PRESENT FINDINGS:    ochoa was removed yesterday and voiding       ****************************************************************************************************  PHYSICAL EXAM:    Vital Signs Last 24 Hrs  T(C): 36.3 (26 Dec 2018 07:13), Max: 37.7 (25 Dec 2018 21:18)  T(F): 97.3 (26 Dec 2018 07:13), Max: 99.9 (25 Dec 2018 21:18)  HR: 75 (26 Dec 2018 07:13) (71 - 88)  BP: 111/67 (26 Dec 2018 07:13) (111/67 - 133/68)  BP(mean): --  RR: 17 (26 Dec 2018 07:13) (17 - 18)  SpO2: 98% (26 Dec 2018 07:13) (98% - 100%)    GENERAL:  more alert     ABDOMEN: soft     BACK:    LOWER EXTREMITIES:    NEUROLOGICAL:    **********************************************************************************************************  LABS:                        9.4    8.08  )-----------( 250      ( 25 Dec 2018 06:10 )             28.0     12-26    140  |  106  |  38<H>  ----------------------------<  111<H>  4.1   |  28  |  0.83    Ca    7.9<L>      26 Dec 2018 08:23  Mg     2.0     12-26          Urine Culture:     RADIOLOGY & ADDITIONAL STUDIES:      IMPRESSION:  hx retention    PLAN: have patient void and then do a bladder scan to make sure empties well   will be going to rehab today

## 2018-12-26 NOTE — PROGRESS NOTE ADULT - SUBJECTIVE AND OBJECTIVE BOX
Neurology Follow up note ;  seen around noon time; daughter at bed side,    SIMONE [id number]    HPI:  SIMONE RIVERA is an 84 YO Female brought to ER because of left hip pain after a fall at home.  Patient was unable to get up from floor after a fall due to pain in left hip.  No LOC patient tripped and fall onto left side at home. No head injury, headache, neck or back pain. (19 Dec 2018 22:09)      Interval History -daughter reported sundowning yesterday.    Patient is seen, chart was reviewed and case was discussed with the treatment team.  Pt is not in any distress.   Lying on bed comfortably.   No events reported overnight.   No clinical seizure was reported.  Sitting on chair bed comfortably.    is at bedside.    Vital Signs Last 24 Hrs  T(C): 36.3 (26 Dec 2018 07:13), Max: 37.7 (25 Dec 2018 21:18)  T(F): 97.3 (26 Dec 2018 07:13), Max: 99.9 (25 Dec 2018 21:18)  HR: 75 (26 Dec 2018 07:13) (71 - 88)  BP: 111/67 (26 Dec 2018 07:13) (111/67 - 133/68)  BP(mean): --  RR: 17 (26 Dec 2018 07:13) (17 - 18)  SpO2: 98% (26 Dec 2018 07:13) (98% - 100%)        REVIEW OF SYSTEMS:    Constitutional: No fever, weight loss or fatigue  Eyes: No eye pain, visual disturbances, or discharge  ENT:  No difficulty hearing, tinnitus, vertigo; No sinus or throat pain  Neck: No pain or stiffness  Respiratory: No cough, wheezing, chills or hemoptysis  Cardiovascular: No chest pain, palpitations, shortness of breath, dizziness or leg swelling  Gastrointestinal: No abdominal or epigastric pain. No nausea, vomiting or hematemesis; No diarrhea or constipation. No melena or hematochezia.  Genitourinary: No dysuria, frequency, hematuria or incontinence  Neurological: No headaches, memory loss, loss of strength, numbness or tremors  Psychiatric: No depression,   Skin: No itching, burning, rashes or lesions   Lymph Nodes: No enlarged glands  Endocrine: No heat or cold intolerance; No hair loss, No h/o diabetes or thyroid dysfunction  Allergy and Immunologic: No hives or eczema    On Neurological Examination:    Mental Status - Pt is alert, awake, oriented X2  Follows commands well and able to answer questions appropriately.  Mood and affect  normal    Speech -  Normal.    Cranial Nerves - Pupils 3 mm equal and reactive to light, extraocular eye movements intact.  Pt has no facial asymmetry. Facial sensation is intact  .Tongue - is in midline.    Muscle tone - is normal      Motor Exam - 5/5 of UE.  RLE 4/5 LLE 3/5; LIMITED DUE TO PAIN.    Sensory Exam  Pt withdraws all extremities equally on stimulation. No asymmetry seen.     coordination    Finger to nose: normal      Deep tendon Reflexes - 2 plus all over.          Neck Supple -  Yes.     MEDICATIONS    acetaminophen   Tablet .. 650 milliGRAM(s) Oral every 6 hours PRN  ascorbic acid 500 milliGRAM(s) Oral two times a day  carbidopa/levodopa  25/100 1 Tablet(s) Oral three times a day  diphenhydrAMINE 25 milliGRAM(s) Oral at bedtime PRN  docusate sodium 100 milliGRAM(s) Oral three times a day  enoxaparin Injectable 40 milliGRAM(s) SubCutaneous every 24 hours  ferrous    sulfate 325 milliGRAM(s) Oral three times a day with meals  folic acid 1 milliGRAM(s) Oral daily  HYDROmorphone  Injectable 0.5 milliGRAM(s) IV Push every 6 hours PRN  magnesium hydroxide Suspension 30 milliLiter(s) Oral daily PRN  magnesium oxide 400 milliGRAM(s) Oral daily  multivitamin 1 Tablet(s) Oral daily  ondansetron Injectable 4 milliGRAM(s) IV Push every 6 hours PRN  oxyCODONE    IR 10 milliGRAM(s) Oral every 4 hours PRN  oxyCODONE    IR 5 milliGRAM(s) Oral every 4 hours PRN  polyethylene glycol 3350 17 Gram(s) Oral daily  sodium chloride 0.9%. 1000 milliLiter(s) IV Continuous <Continuous>  tamsulosin 0.4 milliGRAM(s) Oral at bedtime      Allergies    No Known Allergies    Intolerances    CBC Full  -  ( 25 Dec 2018 06:10 )  WBC Count : 8.08 K/uL  Hemoglobin : 9.4 g/dL  Hematocrit : 28.0 %  Platelet Count - Automated : 250 K/uL  Mean Cell Volume : 86.2 fl  Mean Cell Hemoglobin : 28.9 pg  Mean Cell Hemoglobin Concentration : 33.6 gm/dL  Auto Neutrophil # : 5.90 K/uL  Auto Lymphocyte # : 1.18 K/uL  Auto Monocyte # : 0.78 K/uL  Auto Eosinophil # : 0.12 K/uL  Auto Basophil # : 0.01 K/uL  Auto Neutrophil % : 73.0 %    Auto Basophil % : 0.1 %      Hemoglobin A1C:     Vitamin B12     RADIOLOGY  < from: CT Head No Cont (12.22.18 @ 15:45) >    EXAM:  CT BRAIN                            PROCEDURE DATE:  12/22/2018          INTERPRETATION:  .    CLINICAL INFORMATION: Change in mental status.    TECHNIQUE: Multiple axial CT images of the head were obtained without   contrast. Sagittal and coronal reconstructed images were acquired from   the source data.    COMPARISON: No prior CT studies of the brain are available for comparison   at this institution.    FINDINGS: There is a 1.4 x 1.1 x 1.3 cm (AP x TRV x CC) hyperdense   appearing left-sided intraventricular mass which is inseparable from the   left choroid plexus in the lateral ventricular body with extension to the   ventricular trigone. There is no associated mass effect or shift of   midline structures. There is no hydrocephalus.    There is no acute intracranial hemorrhage or abnormal extra axial fluid   collection.    There is diffuse cerebral volume loss with prominence of the sulci,   fissures, and cisternal spaces which is normal for the patient's age.   There is minimal periventricular white matter hypoattenuation   statistically compatible with microvascular changes given calcific   atherosclerotic disease of the intracranial arteries.    The paranasal sinuses and mastoid air cells are clear. The calvarium   appears intact. The orbits appear unremarkable.    IMPRESSION: No acute intracranial hemorrhage.    1.4 cm hyperdense appearing left-sided intraventricular mass inseparable   from the choroid plexus, as discussed. Recommend further evaluation with   a contrast-enhanced MRI examination, if there are no clinical   contraindications.    VIVEK LESTER M.D., ATTENDING RADIOLOGIST  This document has been electronically signed. Dec 22 2018  3:52PM                ASSESSMENT AND PLAN:       ENCEPHALOPATHY IMPROVING  LIKELY RELATED TO FEVER/ANESTHETIC AGENT  SP LEFT HIP ORIF.  PARKINSONISM.  left LATERAL VENTRICULAR MASS; OLD FINDING,    NEURO FOLLOW  UP AS OUT PATIENT AS   SHE WOULD NEED FOLLOW UP MRI FOR VENTRICULAR MASS OUT PATIENT  CONTINUE SINEMET.  DW PATIENT DAUGHTER AT BED SIDE,  NEURO MANRIQUE STABLE FOR DC,

## 2018-12-26 NOTE — PROGRESS NOTE ADULT - REASON FOR ADMISSION
Left hip fracture

## 2018-12-26 NOTE — PROGRESS NOTE ADULT - PROBLEM SELECTOR PROBLEM 2
Encephalopathy, unspecified
Fall at home, initial encounter
Closed fracture of left hip, initial encounter

## 2018-12-26 NOTE — CHART NOTE - NSCHARTNOTEFT_GEN_A_CORE
Assessment: Pt seen for nutrition follow up. Chart reviewed, hospital course noted. Per chart, pt is 84 Y/O [place] speaking female admitted 12/19/18 for L hip fracture, S/P closed fracture of L femur 12/20. Also noted per chart the family is refusing further workup regarding potential malignancy/gi pathology/cancer in light of 9 pound wt loss [id number].     Pt asleep during time of interview, subjective information obtained from son at bedside. Per son, pt with good appetite/intake, typically consuming % of meals provided. States that patient does well with meals assistance. Denied N/V/diarrhea/constipation, last BM yesterday. Pt without her dentures, requires softer foods. Pt also consuming 2 Ensure Enlive daily per son. Will provide food preferences (cottage cheese, jello, pudding).     Factors impacting intake: [X] none [ ] nausea  [ ] vomiting [ ] diarrhea [ ] constipation  [ ]chewing problems [ ] swallowing issues  [ ] other:     Diet, Regular (12-21-18 @ 07:12)    Intake: % per son    Current Weight: no new weight noted since 104.9 pounds (12/20)    Pertinent Medications: MEDICATIONS  (STANDING):  ascorbic acid 500 milliGRAM(s) Oral two times a day  carbidopa/levodopa  25/100 1 Tablet(s) Oral three times a day  docusate sodium 100 milliGRAM(s) Oral three times a day  enoxaparin Injectable 40 milliGRAM(s) SubCutaneous every 24 hours  ferrous    sulfate 325 milliGRAM(s) Oral three times a day with meals  folic acid 1 milliGRAM(s) Oral daily  magnesium oxide 400 milliGRAM(s) Oral daily  multivitamin 1 Tablet(s) Oral daily  polyethylene glycol 3350 17 Gram(s) Oral daily  tamsulosin 0.4 milliGRAM(s) Oral at bedtime    MEDICATIONS  (PRN):  acetaminophen   Tablet .. 650 milliGRAM(s) Oral every 6 hours PRN Temp greater or equal to 38C (100.4F), Mild Pain (1 - 3)  diphenhydrAMINE 25 milliGRAM(s) Oral at bedtime PRN Insomnia  HYDROmorphone  Injectable 0.5 milliGRAM(s) IV Push every 6 hours PRN breakthrough pain  magnesium hydroxide Suspension 30 milliLiter(s) Oral daily PRN Constipation  ondansetron Injectable 4 milliGRAM(s) IV Push every 6 hours PRN Nausea and/or Vomiting  oxyCODONE    IR 10 milliGRAM(s) Oral every 4 hours PRN Severe Pain (7 - 10)  oxyCODONE    IR 5 milliGRAM(s) Oral every 4 hours PRN Moderate Pain (4 - 6)    Pertinent Labs: 12-26 Na140 mmol/L Glu 111 mg/dL<H> K+ 4.1 mmol/L Cr  0.83 mg/dL BUN 38 mg/dL<H> 12-24 Alb 2.1 g/dL<L> 12-21 [id number] 5.5 % 12-20 Chol 152 mg/dL LDL 80 mg/dL HDL 62 mg/dL Trig 52 mg/dL     CAPILLARY BLOOD GLUCOSE        Skin: +1 L hip edema, L heel stage 1 pressure injury    Estimated Needs:   [X] no change since previous assessment  [ ] recalculated:     Previous Nutrition Diagnosis:   [X] Inadequate Oral Intake    Nutrition Diagnosis is [X] ongoing  [ ] resolved [ ] not applicable     New Nutrition Diagnosis: [X] not applicable       Interventions:   Recommend  [ ] Change Diet To:  [X] Nutrition Supplement: Order Ensure Enlive BID; pending verification made to MD.   [ ] Nutrition Support  [X] Other: Continue regular liberalized diet. Encourage po/fluid intake and RD to provide patient's preferences.    Monitoring and Evaluation:   [X] PO intake [ x ] Tolerance to diet prescription [ x ] weights [ x ] labs[ x ] follow up per protocol  [X] other: Obtain current weight.

## 2021-03-03 NOTE — PATIENT PROFILE ADULT - INFORMATION PROVIDED TO:
ED TO INPATIENT HANDOFF NOTE:      ADMISSION DIAGNOSIS:   Septic shock (CMS/[facility]) [A41.9, R65.21]      VITALS:  Vitals:    03/02/21 1900 03/02/21 1930 03/02/21 2000   Temp:      Pulse: 89 78 92   Resp:  20 20   SpO2: 99% 99% 99%   BP: (!) 154/98 (!) 164/93 (!) 175/101       Oxygen Needs        Mental Status       Safety         Tele:Yes    RHYTHM: NSR    Tele Box:[id number]  Final Check completed with centralized telemetry unit prior to ED transfer.       Mobility          Isolation        Residence:         Additional Information:       ER Nurse: Arleen Hutchison, RN     Phone:   support person

## 2022-11-21 NOTE — PROVIDER CONTACT NOTE (CHANGE IN STATUS NOTIFICATION) - RECOMMENDATIONS
Past Medical History:   Diagnosis Date     Chronic adenoiditis 3/3/2010     Encounter for insertion of mirena IUD 10/2/2018    Remove by 10/2/2023     Hypertrophy of nasal turbinates 3/3/2010     Iron deficiency anemia, unspecified     TREATED WITH IRON SUPPLEMENTS     Mild intermittent asthma      Plantar fasciitis 3/15/2012     Pneumonia, organism unspecified(486)     Pneumonia     Post partum depression 7/5/2018     Seasonal allergies      Patient Active Problem List   Diagnosis     Attention deficit disorder     Generalized anxiety disorder     Chronic adenoiditis     Severe obesity (BMI 35.0-39.9)     Major depressive disorder, recurrent episode, mild (H)     Mild persistent asthma without complication     Seasonal allergic rhinitis     History of postpartum depression     High-risk pregnancy     Labor and delivery, indication for care     Pre-eclampsia, severe, antepartum, third trimester     Past Surgical History:   Procedure Laterality Date     HC THERAPUTIC FRACTURE INFER TURBINATE  03/30/10     TONSILLECTOMY & ADENOIDECTOMY  03/30/10    turbinoplasty     Social History     Socioeconomic History     Marital status:      Spouse name: Not on file     Number of children: Not on file     Years of education: Not on file     Highest education level: Not on file   Occupational History     Not on file   Tobacco Use     Smoking status: Never     Smokeless tobacco: Never     Tobacco comments:     no smokers in the household   Vaping Use     Vaping Use: Never used   Substance and Sexual Activity     Alcohol use: No     Drug use: No     Sexual activity: Yes     Partners: Male   Other Topics Concern     Not on file   Social History Narrative    1/2018  Lives in [place] with  Vaughn and daughter, Dinah.  Vaughn smokes.  Has indoor cats.  Aware of toxoplasmosis precautions.  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  No concerns about domestic violence.     Social Determinants of Health     Financial Resource Strain: Not on  file   Food Insecurity: Not on file   Transportation Needs: Not on file   Physical Activity: Not on file   Stress: Not on file   Social Connections: Not on file   Intimate Partner Violence: Not on file   Housing Stability: Not on file     Family History   Problem Relation Age of Onset     Gastrointestinal Disease Father      Heart Disease Father      Back Pain Father      Diabetes Mother      Hypertension Mother      Hyperlipidemia Mother      Anxiety Disorder Sister      Depression Sister      Hearing Loss Sister         congenital     Coronary Artery Disease Maternal Grandmother      Cerebrovascular Disease Maternal Grandmother      Diabetes Maternal Grandfather         Type II     Cerebrovascular Disease Maternal Grandfather      Cancer Paternal Grandmother         lung (she was a smoker)     Coronary Artery Disease Paternal Grandfather         aortic aneurysm     Food Allergy Daughter 3        peanut     Allergies Daughter      Asthma Daughter            SUBJECTIVE FINDINGS:  A 25 year old presents for a painful ingrown toenail on the right lateral hallux nail border that she feels is infected.  She relates it has been going on for about 3 weeks' duration.  She has soaked it in Epsom salt and used a Band-Aid and Neosporin on it, and it has not gotten any better.  She relates no injuries.  She relates it hurts.  She relates she did have the left big toenail border removed in the past for similar problem.  She relates she does have some allergies to penicillin and Keflex.  She relates she has taken Zithromax and clindamycin in the past with no problems.    ALLERGIES:  Penicillin and Keflex.    OBJECTIVE FINDINGS:  DP and PT are 2/4, right.  CFT is less than 3 seconds.  She has a right lateral hallux nail border with serosanguineous pustular drainage.  There is erythema and edema and pain on palpation of the lateral nail border.  There is an incurvated nail.  There is no odor, no calor.    ASSESSMENT AND PLAN:   Paronychia, right lateral hallux nail border, with infection. Diagnosis and treatment options discussed with the patient.  The patient requests a PNA procedure.  Procedure, potential risks, expected benefits, expected outcomes and followup discussed with the patient.  Consent signed.  The right hallux was anesthetized with 5 mL of 1% lidocaine plain using a hallux digit block.  The right foot was prepped and draped in the sterile technique.  Anesthesia was checked and achieved.  The right hallux was exsanguinated and a Penrose tourniquet applied to the base of the hallux.  The right lateral hallux nail border was freed from its margins using a dermal spatula.  The right lateral hallux nail border was removed using an English anvil and a hemostat.  The nail border was curetted of debris.  Phenol was applied using EZ phenol swabs x3 for 30 seconds each to the right lateral hallux nail border.  The right lateral hallux nail border was copiously irrigated with rubbing alcohol.  The tourniquet was released, and CFT returned to less than 3 seconds, which was preop level.  Bacitracin and sterile compression dressing applied to the right lateral hallux nail border.  The patient was instructed on postop wound cares and foot soaks.  She will use Tylenol or Advil for postoperative pain management.  Prescription for Zithromax and Cortisporin otic solution given and use discussed with her.  Dressings were dispensed.  Return to clinic and see me in 1 week.          Moderate level of medical decision making.   Apply Cavilon skin protectant and off load with z-float boots

## 2024-04-25 NOTE — ED ADULT NURSE NOTE - NS ED NURSE REPORT GIVEN TO FT
Aleksey Johnson was seen and treated in our emergency department on 4/25/2024.                Diagnosis:     Aleksey  .    She may return on this date:     No lifting with right hand until cleared by PCP or orthopedics      If you have any questions or concerns, please don't hesitate to call.      Fiona Pierce MD    ______________________________           _______________          _______________  Hospital Representative                              Date                                Time
Urvashi

## 2024-11-19 NOTE — OCCUPATIONAL THERAPY INITIAL EVALUATION ADULT - AMBULATORY DEVICES NEEDED
Caller: Marilyn Pineda    Relationship: Self    Best call back number: [phone]    What was the call regarding: PATIENT NEEDS A MRI REFERRAL TO BE SENT TO [facility], [address] [place] [address], PHONE NUMBER [phone].    CANCELLED THE 12/3/2024 APPT, PATIENT WILL CALL BACK TO R/S ONCE SHE HAS  THE MRI SCHEDULED.     CALL PATIENT TO LET HER KNOW THE ORDER WAS SENT   Order faxed per pt request and confirmation received.   yes/cane ADMIT